# Patient Record
Sex: MALE | Race: WHITE | Employment: UNEMPLOYED | URBAN - METROPOLITAN AREA
[De-identification: names, ages, dates, MRNs, and addresses within clinical notes are randomized per-mention and may not be internally consistent; named-entity substitution may affect disease eponyms.]

---

## 2023-03-21 ENCOUNTER — HOSPITAL ENCOUNTER (INPATIENT)
Age: 23
LOS: 10 days | Discharge: HOME | DRG: 121 | End: 2023-03-31
Attending: EMERGENCY MEDICINE | Admitting: INTERNAL MEDICINE
Payer: MEDICAID

## 2023-03-21 ENCOUNTER — APPOINTMENT (OUTPATIENT)
Dept: CT IMAGING | Age: 23
DRG: 121 | End: 2023-03-21
Payer: MEDICAID

## 2023-03-21 DIAGNOSIS — K35.30 ACUTE APPENDICITIS WITH LOCALIZED PERITONITIS, WITHOUT PERFORATION, ABSCESS, OR GANGRENE: Primary | ICD-10-CM

## 2023-03-21 DIAGNOSIS — I31.39 PERICARDIAL EFFUSION: ICD-10-CM

## 2023-03-21 DIAGNOSIS — J90 PLEURAL EFFUSION: ICD-10-CM

## 2023-03-21 DIAGNOSIS — J90 LOCULATED PLEURAL EFFUSION: ICD-10-CM

## 2023-03-21 PROBLEM — K35.80 ACUTE APPENDICITIS: Status: ACTIVE | Noted: 2023-03-21

## 2023-03-21 PROCEDURE — 1200000000 HC SEMI PRIVATE

## 2023-03-21 PROCEDURE — 74177 CT ABD & PELVIS W/CONTRAST: CPT

## 2023-03-21 PROCEDURE — 99285 EMERGENCY DEPT VISIT HI MDM: CPT

## 2023-03-21 PROCEDURE — 6360000002 HC RX W HCPCS: Performed by: STUDENT IN AN ORGANIZED HEALTH CARE EDUCATION/TRAINING PROGRAM

## 2023-03-21 PROCEDURE — 6370000000 HC RX 637 (ALT 250 FOR IP): Performed by: STUDENT IN AN ORGANIZED HEALTH CARE EDUCATION/TRAINING PROGRAM

## 2023-03-21 PROCEDURE — 6360000004 HC RX CONTRAST MEDICATION: Performed by: RADIOLOGY

## 2023-03-21 PROCEDURE — 99222 1ST HOSP IP/OBS MODERATE 55: CPT | Performed by: SURGERY

## 2023-03-21 PROCEDURE — 2580000003 HC RX 258: Performed by: STUDENT IN AN ORGANIZED HEALTH CARE EDUCATION/TRAINING PROGRAM

## 2023-03-21 PROCEDURE — 2500000003 HC RX 250 WO HCPCS: Performed by: STUDENT IN AN ORGANIZED HEALTH CARE EDUCATION/TRAINING PROGRAM

## 2023-03-21 RX ORDER — SODIUM CHLORIDE 9 MG/ML
INJECTION, SOLUTION INTRAVENOUS CONTINUOUS
Status: DISCONTINUED | OUTPATIENT
Start: 2023-03-21 | End: 2023-03-23

## 2023-03-21 RX ORDER — SODIUM CHLORIDE 9 MG/ML
INJECTION, SOLUTION INTRAVENOUS PRN
Status: CANCELLED | OUTPATIENT
Start: 2023-03-21

## 2023-03-21 RX ORDER — SODIUM CHLORIDE 0.9 % (FLUSH) 0.9 %
10 SYRINGE (ML) INJECTION PRN
Status: DISCONTINUED | OUTPATIENT
Start: 2023-03-21 | End: 2023-03-31 | Stop reason: HOSPADM

## 2023-03-21 RX ORDER — LISDEXAMFETAMINE DIMESYLATE 60 MG/1
60 CAPSULE ORAL DAILY
COMMUNITY

## 2023-03-21 RX ORDER — SODIUM CHLORIDE 0.9 % (FLUSH) 0.9 %
10 SYRINGE (ML) INJECTION EVERY 12 HOURS SCHEDULED
Status: DISCONTINUED | OUTPATIENT
Start: 2023-03-21 | End: 2023-03-31 | Stop reason: HOSPADM

## 2023-03-21 RX ORDER — ONDANSETRON 2 MG/ML
4 INJECTION INTRAMUSCULAR; INTRAVENOUS
Status: CANCELLED | OUTPATIENT
Start: 2023-03-21 | End: 2023-03-22

## 2023-03-21 RX ORDER — ONDANSETRON 2 MG/ML
4 INJECTION INTRAMUSCULAR; INTRAVENOUS EVERY 6 HOURS PRN
Status: DISCONTINUED | OUTPATIENT
Start: 2023-03-21 | End: 2023-03-31 | Stop reason: HOSPADM

## 2023-03-21 RX ORDER — METRONIDAZOLE 500 MG/100ML
500 INJECTION, SOLUTION INTRAVENOUS EVERY 8 HOURS
Status: DISCONTINUED | OUTPATIENT
Start: 2023-03-21 | End: 2023-03-31 | Stop reason: HOSPADM

## 2023-03-21 RX ORDER — SODIUM CHLORIDE 0.9 % (FLUSH) 0.9 %
5-40 SYRINGE (ML) INJECTION PRN
Status: CANCELLED | OUTPATIENT
Start: 2023-03-21

## 2023-03-21 RX ORDER — SODIUM CHLORIDE 0.9 % (FLUSH) 0.9 %
5-40 SYRINGE (ML) INJECTION EVERY 12 HOURS SCHEDULED
Status: CANCELLED | OUTPATIENT
Start: 2023-03-21

## 2023-03-21 RX ORDER — SODIUM CHLORIDE 9 MG/ML
INJECTION, SOLUTION INTRAVENOUS PRN
Status: DISCONTINUED | OUTPATIENT
Start: 2023-03-21 | End: 2023-03-30 | Stop reason: SDUPTHER

## 2023-03-21 RX ORDER — POLYETHYLENE GLYCOL 3350 17 G/17G
17 POWDER, FOR SOLUTION ORAL DAILY
Status: DISCONTINUED | OUTPATIENT
Start: 2023-03-22 | End: 2023-03-23

## 2023-03-21 RX ORDER — ACETAMINOPHEN 325 MG/1
650 TABLET ORAL EVERY 4 HOURS PRN
Status: DISCONTINUED | OUTPATIENT
Start: 2023-03-21 | End: 2023-03-23

## 2023-03-21 RX ORDER — MEPERIDINE HYDROCHLORIDE 25 MG/ML
12.5 INJECTION INTRAMUSCULAR; INTRAVENOUS; SUBCUTANEOUS
Status: CANCELLED | OUTPATIENT
Start: 2023-03-21

## 2023-03-21 RX ADMIN — ACETAMINOPHEN 650 MG: 325 TABLET ORAL at 22:05

## 2023-03-21 RX ADMIN — IOPAMIDOL 75 ML: 755 INJECTION, SOLUTION INTRAVENOUS at 19:13

## 2023-03-21 RX ADMIN — HYDROMORPHONE HYDROCHLORIDE 0.5 MG: 1 INJECTION, SOLUTION INTRAMUSCULAR; INTRAVENOUS; SUBCUTANEOUS at 22:04

## 2023-03-21 RX ADMIN — HYDROMORPHONE HYDROCHLORIDE 0.25 MG: 1 INJECTION, SOLUTION INTRAMUSCULAR; INTRAVENOUS; SUBCUTANEOUS at 17:11

## 2023-03-21 RX ADMIN — CEFTRIAXONE SODIUM 2000 MG: 2 INJECTION, POWDER, FOR SOLUTION INTRAMUSCULAR; INTRAVENOUS at 16:59

## 2023-03-21 RX ADMIN — SODIUM CHLORIDE: 9 INJECTION, SOLUTION INTRAVENOUS at 16:57

## 2023-03-21 RX ADMIN — METRONIDAZOLE 500 MG: 500 INJECTION, SOLUTION INTRAVENOUS at 17:16

## 2023-03-21 ASSESSMENT — PAIN SCALES - GENERAL
PAINLEVEL_OUTOF10: 4
PAINLEVEL_OUTOF10: 5

## 2023-03-21 ASSESSMENT — PAIN DESCRIPTION - LOCATION: LOCATION: ABDOMEN

## 2023-03-21 NOTE — PROGRESS NOTES
Radiology Procedure Waiver   Name: Oseas Ceja  : 2000  MRN: [id number]    Date:  3/21/23    Time: 6:21 PM EDT    Benefits of immediately proceeding with Radiology exam(s) without pre-testing outweigh the risks or are not indicated as specified below and therefore the following is/are being waived:    [] Pregnancy test   [] Patients LMP on-time and regular.   [] Patient had Tubal Ligation or has other Contraception Device. [] Patient  is Menopausal or Premenarcheal.    [] Patient had Full or Partial Hysterectomy. [] Protocol for Iodine allergy    [] MRI Questionnaire     [x] BUN/Creatinine   [] Patient age w/no hx of renal dysfunction. [] Patient on Dialysis. [] Recent Normal Labs.   Electronically signed by Noemi Muniz DO on 3/21/23 at 6:21 PM EDT

## 2023-03-21 NOTE — H&P
GENERAL SURGERY  HISTORY AND PHYSICAL  3/21/2023    Chief Complaint   Patient presents with    Abdominal Pain     Transfer from [facility] for acute appendicitis and pulmonary edema concern for lymphoma       HPI  Sherine Snell III is a 21 y.o. male who presents for evaluation of right lower quadrant pain. Patient was a transfer from outside hospital.  Patient states that his pain started acutely at midnight 3/20 and has been constant and located in the right lower quadrant without radiation. Patient states at the time the pain was severe and accompanied with nausea and 1 episode of emesis. At outside facility patient was noted to have a leukocytosis of 16,000 as well as dilation of his appendix to 9 mm with fecaliths. Patient past medical history relatively unremarkable, only takes Vyvanse for ADHD. Patient denies any abdominal surgical history. Patient has never had an EGD or colonoscopy in the past.  Family history relatively unremarkable, states his aunt has breast cancer otherwise no family history. At outside hospital patient was given IV antibiotics and pain medications and was transferred here for surgical evaluation. No past medical history on file. No past surgical history on file. Prior to Admission medications    Not on File       Not on File    No family history on file.       Review of Systems: Review of Systems - History obtained from chart review and the patient  General ROS: negative for - chills or malaise  ENT ROS: negative for - sore throat  Allergy and Immunology ROS: negative for - hives  Hematological and Lymphatic ROS: negative for - fatigue or jaundice  Endocrine ROS: negative for - polydipsia/polyuria  Respiratory ROS: no cough, shortness of breath, or wheezing  Cardiovascular ROS: no chest pain or dyspnea on exertion  Gastrointestinal ROS: positive for - abdominal pain  negative for - heartburn, hematemesis, or melena  Genito-Urinary ROS: no dysuria, trouble voiding, or hematuria  Musculoskeletal ROS: negative for - muscular weakness        PHYSICAL EXAM:    Vitals:    03/21/23 1540   BP: (!) 107/59   Pulse: 75   Resp: 18   Temp: 98.7 °F (37.1 °C)   SpO2: 96%       GENERAL:  NAD. A&Ox3. HEAD:  Normocephalic, atraumatic. EYES:   No scleral icterus. PERRLA. LUNGS: Labored breathing on room air  CARDIOVASCULAR: RR, normotensive  ABDOMEN:  Soft, non-distended, mildly tender localized to the right lower quadrant. Positive for guarding in the right lower quadrant, no rigidity, rebound. ASSESSMENT/PLAN:  21 y.o. male with acute appendicitis    This procedure has been fully reviewed with the patient and written informed consent has been obtained. Plan will be  -Admit  -Modal pain control  -Rocephin and Flagyl  -N.p.o./IV fluids  -Laparoscopic appendectomy     discussed with Dr. Channing Navarrete.     Valdo Devlin DO  Surgery Resident PGY-1  3/21/2023  4:24 PM

## 2023-03-21 NOTE — ED NOTES
Report given to Skye Barahona RN on 5WE. Pt placed in transport at this time.       Heraclio Xiong RN  03/21/23 6753

## 2023-03-22 ENCOUNTER — APPOINTMENT (OUTPATIENT)
Dept: GENERAL RADIOLOGY | Age: 23
DRG: 121 | End: 2023-03-22
Payer: MEDICAID

## 2023-03-22 ENCOUNTER — APPOINTMENT (OUTPATIENT)
Dept: CT IMAGING | Age: 23
DRG: 121 | End: 2023-03-22
Payer: MEDICAID

## 2023-03-22 ENCOUNTER — APPOINTMENT (OUTPATIENT)
Dept: INTERVENTIONAL RADIOLOGY/VASCULAR | Age: 23
DRG: 121 | End: 2023-03-22
Payer: MEDICAID

## 2023-03-22 LAB
ANION GAP SERPL CALCULATED.3IONS-SCNC: 9 MMOL/L (ref 7–16)
BASOPHILS # BLD: 0.01 E9/L (ref 0–0.2)
BASOPHILS NFR BLD: 0.2 % (ref 0–2)
BUN SERPL-MCNC: 11 MG/DL (ref 6–20)
CALCIUM SERPL-MCNC: 9 MG/DL (ref 8.6–10.2)
CHLORIDE SERPL-SCNC: 104 MMOL/L (ref 98–107)
CO2 SERPL-SCNC: 24 MMOL/L (ref 22–29)
CREAT SERPL-MCNC: 0.8 MG/DL (ref 0.7–1.2)
EOSINOPHIL # BLD: 0.08 E9/L (ref 0.05–0.5)
EOSINOPHIL NFR BLD: 1.2 % (ref 0–6)
ERYTHROCYTE [DISTWIDTH] IN BLOOD BY AUTOMATED COUNT: 13.5 FL (ref 11.5–15)
GLUCOSE SERPL-MCNC: 78 MG/DL (ref 74–99)
HCT VFR BLD AUTO: 40.7 % (ref 37–54)
HGB BLD-MCNC: 13.2 G/DL (ref 12.5–16.5)
IMM GRANULOCYTES # BLD: 0.02 E9/L
IMM GRANULOCYTES NFR BLD: 0.3 % (ref 0–5)
INR BLD: 1.6
LYMPHOCYTES # BLD: 1 E9/L (ref 1.5–4)
LYMPHOCYTES NFR BLD: 15.4 % (ref 20–42)
MCH RBC QN AUTO: 28.9 PG (ref 26–35)
MCHC RBC AUTO-ENTMCNC: 32.4 % (ref 32–34.5)
MCV RBC AUTO: 89.3 FL (ref 80–99.9)
MONOCYTES # BLD: 0.54 E9/L (ref 0.1–0.95)
MONOCYTES NFR BLD: 8.3 % (ref 2–12)
NEUTROPHILS # BLD: 4.86 E9/L (ref 1.8–7.3)
NEUTS SEG NFR BLD: 74.6 % (ref 43–80)
PLATELET # BLD AUTO: 230 E9/L (ref 130–450)
PMV BLD AUTO: 9.5 FL (ref 7–12)
POTASSIUM SERPL-SCNC: 4.8 MMOL/L (ref 3.5–5)
PROTHROMBIN TIME: 17.6 SEC (ref 9.3–12.4)
RBC # BLD AUTO: 4.56 E12/L (ref 3.8–5.8)
SODIUM SERPL-SCNC: 137 MMOL/L (ref 132–146)
WBC # BLD: 6.5 E9/L (ref 4.5–11.5)

## 2023-03-22 PROCEDURE — 85610 PROTHROMBIN TIME: CPT

## 2023-03-22 PROCEDURE — 76604 US EXAM CHEST: CPT

## 2023-03-22 PROCEDURE — 71045 X-RAY EXAM CHEST 1 VIEW: CPT

## 2023-03-22 PROCEDURE — 6360000002 HC RX W HCPCS: Performed by: STUDENT IN AN ORGANIZED HEALTH CARE EDUCATION/TRAINING PROGRAM

## 2023-03-22 PROCEDURE — 36415 COLL VENOUS BLD VENIPUNCTURE: CPT

## 2023-03-22 PROCEDURE — 2580000003 HC RX 258: Performed by: STUDENT IN AN ORGANIZED HEALTH CARE EDUCATION/TRAINING PROGRAM

## 2023-03-22 PROCEDURE — 6370000000 HC RX 637 (ALT 250 FOR IP)

## 2023-03-22 PROCEDURE — 1200000000 HC SEMI PRIVATE

## 2023-03-22 PROCEDURE — 6360000004 HC RX CONTRAST MEDICATION: Performed by: RADIOLOGY

## 2023-03-22 PROCEDURE — 2580000003 HC RX 258: Performed by: RADIOLOGY

## 2023-03-22 PROCEDURE — 71260 CT THORAX DX C+: CPT

## 2023-03-22 PROCEDURE — 85025 COMPLETE CBC W/AUTO DIFF WBC: CPT

## 2023-03-22 PROCEDURE — 99232 SBSQ HOSP IP/OBS MODERATE 35: CPT | Performed by: SURGERY

## 2023-03-22 PROCEDURE — 2500000003 HC RX 250 WO HCPCS: Performed by: STUDENT IN AN ORGANIZED HEALTH CARE EDUCATION/TRAINING PROGRAM

## 2023-03-22 PROCEDURE — 80048 BASIC METABOLIC PNL TOTAL CA: CPT

## 2023-03-22 PROCEDURE — 0W9B3ZZ DRAINAGE OF LEFT PLEURAL CAVITY, PERCUTANEOUS APPROACH: ICD-10-PCS | Performed by: STUDENT IN AN ORGANIZED HEALTH CARE EDUCATION/TRAINING PROGRAM

## 2023-03-22 RX ORDER — SODIUM CHLORIDE 0.9 % (FLUSH) 0.9 %
10 SYRINGE (ML) INJECTION
Status: COMPLETED | OUTPATIENT
Start: 2023-03-22 | End: 2023-03-22

## 2023-03-22 RX ORDER — OXYCODONE HYDROCHLORIDE 5 MG/1
5 TABLET ORAL EVERY 4 HOURS PRN
Status: DISCONTINUED | OUTPATIENT
Start: 2023-03-22 | End: 2023-03-26

## 2023-03-22 RX ORDER — HYDROCODONE BITARTRATE AND ACETAMINOPHEN 5; 325 MG/1; MG/1
1 TABLET ORAL EVERY 6 HOURS PRN
Status: DISCONTINUED | OUTPATIENT
Start: 2023-03-22 | End: 2023-03-22 | Stop reason: ALTCHOICE

## 2023-03-22 RX ORDER — OXYCODONE HYDROCHLORIDE 10 MG/1
10 TABLET ORAL EVERY 4 HOURS PRN
Status: DISCONTINUED | OUTPATIENT
Start: 2023-03-22 | End: 2023-03-24

## 2023-03-22 RX ADMIN — CEFTRIAXONE SODIUM 2000 MG: 2 INJECTION, POWDER, FOR SOLUTION INTRAMUSCULAR; INTRAVENOUS at 16:32

## 2023-03-22 RX ADMIN — METRONIDAZOLE 500 MG: 500 INJECTION, SOLUTION INTRAVENOUS at 16:37

## 2023-03-22 RX ADMIN — HYDROMORPHONE HYDROCHLORIDE 0.5 MG: 1 INJECTION, SOLUTION INTRAMUSCULAR; INTRAVENOUS; SUBCUTANEOUS at 05:54

## 2023-03-22 RX ADMIN — Medication 10 ML: at 14:38

## 2023-03-22 RX ADMIN — SODIUM CHLORIDE, PRESERVATIVE FREE 10 ML: 5 INJECTION INTRAVENOUS at 05:54

## 2023-03-22 RX ADMIN — OXYCODONE HYDROCHLORIDE 10 MG: 10 TABLET ORAL at 22:06

## 2023-03-22 RX ADMIN — METRONIDAZOLE 500 MG: 500 INJECTION, SOLUTION INTRAVENOUS at 08:02

## 2023-03-22 RX ADMIN — OXYCODONE HYDROCHLORIDE 5 MG: 5 TABLET ORAL at 16:31

## 2023-03-22 RX ADMIN — IOPAMIDOL 75 ML: 755 INJECTION, SOLUTION INTRAVENOUS at 14:38

## 2023-03-22 RX ADMIN — METRONIDAZOLE 500 MG: 500 INJECTION, SOLUTION INTRAVENOUS at 00:41

## 2023-03-22 ASSESSMENT — PAIN DESCRIPTION - LOCATION
LOCATION: ABDOMEN

## 2023-03-22 ASSESSMENT — PAIN DESCRIPTION - DESCRIPTORS
DESCRIPTORS: ACHING;DISCOMFORT;NAGGING
DESCRIPTORS: ACHING;STABBING;DISCOMFORT
DESCRIPTORS: ACHING;DISCOMFORT;SORE

## 2023-03-22 ASSESSMENT — PAIN SCALES - GENERAL
PAINLEVEL_OUTOF10: 7
PAINLEVEL_OUTOF10: 7
PAINLEVEL_OUTOF10: 4
PAINLEVEL_OUTOF10: 0
PAINLEVEL_OUTOF10: 4

## 2023-03-22 ASSESSMENT — PAIN DESCRIPTION - PAIN TYPE
TYPE: ACUTE PAIN
TYPE: ACUTE PAIN

## 2023-03-22 ASSESSMENT — PAIN - FUNCTIONAL ASSESSMENT: PAIN_FUNCTIONAL_ASSESSMENT: ACTIVITIES ARE NOT PREVENTED

## 2023-03-22 ASSESSMENT — PAIN DESCRIPTION - ORIENTATION
ORIENTATION: RIGHT;LEFT
ORIENTATION: RIGHT
ORIENTATION: RIGHT

## 2023-03-22 ASSESSMENT — PAIN DESCRIPTION - ONSET: ONSET: ON-GOING

## 2023-03-22 ASSESSMENT — PAIN DESCRIPTION - FREQUENCY: FREQUENCY: CONTINUOUS

## 2023-03-22 NOTE — BRIEF OP NOTE
Brief Postoperative Note      Patient: Patel Laguna III  YOB: 2000  MRN: [id number]    Date of Procedure: 3/22/2023    Pre-Op Diagnosis: * Left consolidative lung process  Post-Op Diagnosis: Same       Ultrasound evaluation. Tiny volume of loculated fluid only. Patient had a vagal episode and procedure was stopped    Marcello Ferrer    Assistant:  * No surgical staff found *    Anesthesia: * No anesthesia type entered *    Estimated Blood Loss (mL): Minimal    Complications: None    Specimens:   * No specimens in log *    Implants:  * No implants in log *      Drains: * No LDAs found *    Findings:Tiny volume of fluid only on US. Very loculated. No procedure performed.     Electronically signed by Marcello Ferrer MD on 3/22/2023 at 4:26 PM

## 2023-03-22 NOTE — PROCEDURES
Dr Sanket Guidry attempted to place thoracentesis catheter but was unable to draw any fluid due to location of adequate fluid for drainage and anatomy. Patient became light headed and stated he felt like he was going to pass out. BP dropped to 80/52 with HR of 46. Procedure stopped at this time and will be rescheduled per Dr Sanket Guidry. 500mL 0.9% NSS bolus given at that time. Patient states he feels better and no longer feels lightheaded or dizzy. BP now 100/60 with HR of 60. Patient to CT for scan at this time, report called.

## 2023-03-22 NOTE — PROCEDURES
Patient arrived via stretcher to Radiology department for a left thoracentesis with Dr. Reny García. Patient is alert and oriented, denies shortness of breath or discomfort. Allergies, medications, H&P and fasting instructions reviewed with patient. Patient placed on continuous heart monitor, vital signs taken. Procedural instructions given, questions answered, understanding expressed. Awaiting for Dr. Reny García to talk to patient and have procedural consent signed.

## 2023-03-22 NOTE — PROGRESS NOTES
GENERAL SURGERY  DAILY PROGRESS NOTE  3/22/2023  Chief Complaint   Patient presents with    Abdominal Pain     Transfer from [facility] for acute appendicitis and pulmonary edema concern for lymphoma       Subjective:  Pain  improved this morning. No nausea no vomiting. Objective:  /69   Pulse 64   Temp 97.4 °F (36.3 °C) (Temporal)   Resp 16   Ht 5' 11\" (1.803 m)   Wt 165 lb (74.8 kg)   SpO2 97%   BMI 23.01 kg/m²     GENERAL:  Laying in bed, awake, alert, cooperative, no apparent distress  HEAD: Normocephalic, atraumatic  EYES: No sclera icterus, pupils equal  LUNGS:  No increased work of breathing  CARDIOVASCULAR:  regular rate  ABDOMEN:  Soft,  mild TTP right lower quadrant, no peritoneal signs, no distention  EXTREMITIES: No edema or swelling  SKIN: Warm and dry, no rashes or lesions    Assessment/Plan:  21 y.o. male with signs and symptoms consistent with acute appendicitis. Patient noted to have a very large left-sided pleural effusion with pericardial effusion. We will continue to treat acute appendicitis nonoperatively with IV antibiotics  I will ask interventional radiology team to perform a thoracentesis on his left chest and send for thoracentesis studies. Patient noted to have weight loss, night sweats, and generalized malaise over the past months to year    Electronically signed by Sabi Kelly MD on 3/22/2023 at 7:27 AM      Attending Attestation   I saw and examined the patient, I agree with resident note      Hx taken from patient and chart    I personally reviewed the imaging, pleural effusion and pericardial effusion,      CT imaging chest ordered,  IR drainage from pleural fluid ordered     Discussed uncertainty of dx with pt and girlfriend at bedside. He has likely simple acute appendicitis which at this point we will treat with Abx rather than surgery in lieu of work up for pericardial and pleural fluid collections.      I am managing his pain with PRN IV narcotics.       Goran Shetty MD FACS

## 2023-03-22 NOTE — CARE COORDINATION
3/22/2023social work transition of care planning  Pt is from home with family and had been independent. Pt sees a pcp at a clinic in [id number] [address]) and pharmacy is [facility] in [place]. Explained sw role in transition of care planning. Pt plan is home, pt will have a ride at discharge.   Electronically signed by LIDIA Anderson on 3/22/2023 at 10:20 AM

## 2023-03-22 NOTE — DISCHARGE SUMMARY
Physician Discharge Summary     Patient ID:  Melo Boyd  [id number]  60 y.o.  2000    Admit date: 3/21/2023    Discharge date and time: No discharge date for patient encounter. Admitting Physician: Ofelia Monzon MD     Admission Diagnoses: Pleural effusion [J90]  Acute appendicitis [K35.80]  Acute appendicitis with localized peritonitis, without perforation, abscess, or gangrene [K35.30]    Discharge Diagnoses: Principal Problem:    Acute appendicitis  Resolved Problems:    * No resolved hospital problems. *      Admission Condition: good    Discharged Condition: stable    Indication for Admission: Acute appendicitis, large left-sided pleural effusion with pericardial effusion    Hospital Course/Procedures/Operation/treatments:   3/21-22: Patient admitted with signs and symptoms consistent with acute appendicitis. He was found to have a large left-sided pleural effusion and pericardial effusion. IR will attempt thoracentesis today  3/23: IR unable to drain the left pleural effusion because of that the fluid was too thick. CT surgery consulted          Consults:   IP CONSULT TO GENERAL SURGERY    Significant Diagnostic Studies:   CT ABDOMEN PELVIS W IV CONTRAST Additional Contrast? None    Result Date: 3/21/2023  EXAMINATION: CT OF THE ABDOMEN AND PELVIS WITH CONTRAST 3/21/2023 7:13 pm TECHNIQUE: CT of the abdomen and pelvis was performed with the administration of intravenous contrast. Multiplanar reformatted images are provided for review. Automated exposure control, iterative reconstruction, and/or weight based adjustment of the mA/kV was utilized to reduce the radiation dose to as low as reasonably achievable. COMPARISON: None.  HISTORY: ORDERING SYSTEM PROVIDED HISTORY: rlq pain TECHNOLOGIST PROVIDED HISTORY: Reason for exam:->rlq pain Additional Contrast?->None What reading provider will be dictating this exam?->CRC FINDINGS: Enlarged diameter of the appendix measuring up to 9 mm with hyperemic mucosa and subtle adjacent fat stranding. Small free fluid located in the pelvis. No free air. No bowel obstruction. No intrahepatic bile duct dilatation. Mild distension of the gallbladder with small pericholecystic fluid. Spleen is unremarkable. Numerous cysts or hemangiomas are associated with the spleen. Normal attenuation to both kidneys. No hydronephrosis or perinephric edema. No retroperitoneal lymphadenopathy. Urinary bladder is normal in contour. There is atelectasis seen in the left lung base with volume loss. There is a moderate left sub pleural effusion of increased attenuation. Moderate pericardial effusion. Severe central canal stenosis seen at L4/L5 related to disc herniation. 1. Findings above suggest acute appendicitis. 2. Small free fluid in the pelvis. 3. Mild distension of the gallbladder with suspected small pericholecystic fluid versus gallbladder wall thickening. Ultrasound gallbladder could be helpful for further evaluation. 4. Volume loss associated with visualized left lower lung with subpleural effusion of increased attenuation. Clinical correlation recommended. 5. Atelectatic changes are also present in visualized left lung base. 6. Moderate pericardial effusion. Discharge Exam:  *** Copy and paste last PN exam ***     Disposition: {disposition:22280}    In process/preliminary results:  Outstanding Order Results       No orders found for last 30 day(s). Patient Instructions:   Current Discharge Medication List             Details   Lisdexamfetamine Dimesylate (VYVANSE) 60 MG CAPS Take 60 mg by mouth daily. ***Copy and Paste Discharge Instructions***     Follow up:   No follow-up provider specified.      Signed:  Vianney Mccarty MD  3/22/2023  7:33 AM

## 2023-03-22 NOTE — PROGRESS NOTES
[facility]   ATTENDING PHYSICIAN PROGRESS NOTE     I have examined the patient, reviewed the record, and discussed the case with the APN/ Resident. I have reviewed all relevant labs and imaging data. Please refer to the APN/ resident's note. I agree with the assessment and plan with the following corrections/ additions. The following summarizes my clinical findings and independent assessment. CC: RLQ pain    See resident note for full details. Pt reports acute onset of pain in RLQ. Presented to OSH and CT concerning for enlarged fluid filled appendix. Pt transferred here for definitive care. States pain has been present since onset but does not radiate anywhere. Denies nausea or vomiting. Reports he is hungry now. States he has intermittent night sweats; reports generalized malaise; states he had 20 pound unintentional weight loss over past year. Notes from OSH reviewed  Labs from OSH reviewed--WBCs 13.6  Film reports from OSH reviewed--fluid filled dilated appendix; pericardial effusion/pleural effusion    Upon exam, slight tenderness in RLQ but no rebound; no guarding    Given CT findings but no images sent--will repeat CT abd/pelvis. Repeat imaging reviewed--fluid filled dilated appendix; significant pleural/pericardial effusions; ? Multiple small cysts in spleen; distended GB    RLQ pain--could be related to early appendicitis--but, given CT findings, pt needs pleural fluid evaluated and pericardial fluid cause evaluated.     Will cont Abx and monitor abd exam; recommend IR thoracentesis; may need pericardial window also    Myles Tatum MD, [facility]  3/21/2023  9:37 PM

## 2023-03-22 NOTE — PROGRESS NOTES
Spoke with RN regarding patient's ordered left thoracentesis. Ordering provider will need to order new INR, keep patient NPO, and hold all thinners for procedure. Patient is able to sign consent.

## 2023-03-22 NOTE — ED NOTES
Per provider unable to send pt to inpatient room at this time. Waiting for Dr. Cassie Blevins to give permission. Surgeon needs to review CT at this time.       Jeff Angel RN  03/21/23 2028

## 2023-03-22 NOTE — PROGRESS NOTES
General Surgery Progress Note  3/22/2023  Medical Student Note  Admission Date: 3/21/23  Hospital Day: 1    Subjective  Mr. Arvind Pichardo is a 20 y/o male that was transferred from [facility] for acute appendicitis. Patient notes that his pain started on 3/20/23 at midnight with cramping and a burning sensation in his RLQ. He notes that the pain was associated with nausea and vomiting. At [facility] [place] [id number] and CT abdomen and pelvis showed dilation of appendix. Patient also notes he has lost about 20 lbs in the past year, having symptoms of night sweats, wheezing and general malaise. On today's examination patient notes his pain has been controlled. He denies fevers, chills, chest pain, SOB, or any other symptoms. Objective  Vitals:   Temp: 97 RR: 17 HR: 57 BP: 87/65 SpO2: 97    Physical Exam:   General: No acute distress, Laying on left lateral recumbent  Head: Atraumatic, no lacerations  Neck: Trachea midline  Eyes: PERRLA  Heart: RRR  Lungs: Patient breathing on room air, no usage of accessory muscles, no acute respiratory distress  Abdomen: Soft, non-distended, tenderness to palpation of RLQ, no tenderness to RUQ, LLQ. LUQ or epigastric. Extremities: Moves all extremities, sensation intact, normal capillary refill  Mentation: Aox4    Pertinent labs  CT Abdomen Pelvis  Narrative   EXAMINATION:   CT OF THE ABDOMEN AND PELVIS WITH CONTRAST 3/21/2023 7:13 pm       TECHNIQUE:   CT of the abdomen and pelvis was performed with the administration of   intravenous contrast. Multiplanar reformatted images are provided for review. Automated exposure control, iterative reconstruction, and/or weight based   adjustment of the mA/kV was utilized to reduce the radiation dose to as low   as reasonably achievable. COMPARISON:   None.        HISTORY:   ORDERING SYSTEM PROVIDED HISTORY: rlq pain   TECHNOLOGIST PROVIDED HISTORY:   Reason for exam:->rlq pain   Additional Contrast?->None

## 2023-03-23 ENCOUNTER — APPOINTMENT (OUTPATIENT)
Dept: GENERAL RADIOLOGY | Age: 23
DRG: 121 | End: 2023-03-23
Payer: MEDICAID

## 2023-03-23 ENCOUNTER — ANESTHESIA EVENT (OUTPATIENT)
Dept: OPERATING ROOM | Age: 23
End: 2023-03-23
Payer: MEDICAID

## 2023-03-23 PROBLEM — J90 LOCULATED PLEURAL EFFUSION: Status: ACTIVE | Noted: 2023-03-21

## 2023-03-23 LAB
ALBUMIN SERPL-MCNC: 3.7 G/DL (ref 3.5–5.2)
ALP SERPL-CCNC: 121 U/L (ref 40–129)
ALT SERPL-CCNC: 8 U/L (ref 0–40)
ANION GAP SERPL CALCULATED.3IONS-SCNC: 10 MMOL/L (ref 7–16)
AST SERPL-CCNC: 12 U/L (ref 0–39)
B PARAP IS1001 DNA NPH QL NAA+NON-PROBE: NOT DETECTED
B PERT.PT PRMT NPH QL NAA+NON-PROBE: NOT DETECTED
BASOPHILS # BLD: 0.02 E9/L (ref 0–0.2)
BASOPHILS NFR BLD: 0.3 % (ref 0–2)
BILIRUB SERPL-MCNC: 0.5 MG/DL (ref 0–1.2)
BUN SERPL-MCNC: 10 MG/DL (ref 6–20)
C PNEUM DNA NPH QL NAA+NON-PROBE: NOT DETECTED
CALCIUM SERPL-MCNC: 8.8 MG/DL (ref 8.6–10.2)
CHLORIDE SERPL-SCNC: 105 MMOL/L (ref 98–107)
CK SERPL-CCNC: 160 U/L (ref 20–200)
CO2 SERPL-SCNC: 22 MMOL/L (ref 22–29)
CREAT SERPL-MCNC: 0.8 MG/DL (ref 0.7–1.2)
CRP SERPL HS-MCNC: 4.8 MG/DL (ref 0–0.4)
EOSINOPHIL # BLD: 0.09 E9/L (ref 0.05–0.5)
EOSINOPHIL NFR BLD: 1.3 % (ref 0–6)
ERYTHROCYTE [DISTWIDTH] IN BLOOD BY AUTOMATED COUNT: 13.6 FL (ref 11.5–15)
ERYTHROCYTE [SEDIMENTATION RATE] IN BLOOD BY WESTERGREN METHOD: 20 MM/HR (ref 0–15)
FLUAV RNA NPH QL NAA+NON-PROBE: NOT DETECTED
FLUBV RNA NPH QL NAA+NON-PROBE: NOT DETECTED
GLUCOSE SERPL-MCNC: 83 MG/DL (ref 74–99)
HADV DNA NPH QL NAA+NON-PROBE: NOT DETECTED
HCOV 229E RNA NPH QL NAA+NON-PROBE: NOT DETECTED
HCOV HKU1 RNA NPH QL NAA+NON-PROBE: NOT DETECTED
HCOV NL63 RNA NPH QL NAA+NON-PROBE: NOT DETECTED
HCOV OC43 RNA NPH QL NAA+NON-PROBE: NOT DETECTED
HCT VFR BLD AUTO: 40.3 % (ref 37–54)
HGB BLD-MCNC: 13.3 G/DL (ref 12.5–16.5)
HMPV RNA NPH QL NAA+NON-PROBE: NOT DETECTED
HPIV1 RNA NPH QL NAA+NON-PROBE: NOT DETECTED
HPIV2 RNA NPH QL NAA+NON-PROBE: NOT DETECTED
HPIV3 RNA NPH QL NAA+NON-PROBE: NOT DETECTED
HPIV4 RNA NPH QL NAA+NON-PROBE: NOT DETECTED
IMM GRANULOCYTES # BLD: 0.05 E9/L
IMM GRANULOCYTES NFR BLD: 0.7 % (ref 0–5)
LV EF: 63 %
LVEF MODALITY: NORMAL
LYMPHOCYTES # BLD: 0.69 E9/L (ref 1.5–4)
LYMPHOCYTES NFR BLD: 9.9 % (ref 20–42)
M PNEUMO DNA NPH QL NAA+NON-PROBE: NOT DETECTED
MAGNESIUM SERPL-MCNC: 2 MG/DL (ref 1.6–2.6)
MCH RBC QN AUTO: 29 PG (ref 26–35)
MCHC RBC AUTO-ENTMCNC: 33 % (ref 32–34.5)
MCV RBC AUTO: 87.8 FL (ref 80–99.9)
MONOCYTES # BLD: 0.53 E9/L (ref 0.1–0.95)
MONOCYTES NFR BLD: 7.6 % (ref 2–12)
NEUTROPHILS # BLD: 5.58 E9/L (ref 1.8–7.3)
NEUTS SEG NFR BLD: 80.2 % (ref 43–80)
PLATELET # BLD AUTO: 241 E9/L (ref 130–450)
PMV BLD AUTO: 9.2 FL (ref 7–12)
POTASSIUM SERPL-SCNC: 4 MMOL/L (ref 3.5–5)
PROT SERPL-MCNC: 7 G/DL (ref 6.4–8.3)
RBC # BLD AUTO: 4.59 E12/L (ref 3.8–5.8)
RSV RNA NPH QL NAA+NON-PROBE: NOT DETECTED
RV+EV RNA NPH QL NAA+NON-PROBE: NOT DETECTED
SARS-COV-2 RNA NPH QL NAA+NON-PROBE: NOT DETECTED
SODIUM SERPL-SCNC: 137 MMOL/L (ref 132–146)
WBC # BLD: 7 E9/L (ref 4.5–11.5)

## 2023-03-23 PROCEDURE — 0202U NFCT DS 22 TRGT SARS-COV-2: CPT

## 2023-03-23 PROCEDURE — 6360000002 HC RX W HCPCS: Performed by: INTERNAL MEDICINE

## 2023-03-23 PROCEDURE — 86780 TREPONEMA PALLIDUM: CPT

## 2023-03-23 PROCEDURE — 85025 COMPLETE CBC W/AUTO DIFF WBC: CPT

## 2023-03-23 PROCEDURE — 82550 ASSAY OF CK (CPK): CPT

## 2023-03-23 PROCEDURE — 2060000000 HC ICU INTERMEDIATE R&B

## 2023-03-23 PROCEDURE — 2500000003 HC RX 250 WO HCPCS: Performed by: INTERNAL MEDICINE

## 2023-03-23 PROCEDURE — 6360000002 HC RX W HCPCS: Performed by: STUDENT IN AN ORGANIZED HEALTH CARE EDUCATION/TRAINING PROGRAM

## 2023-03-23 PROCEDURE — 2580000003 HC RX 258: Performed by: INTERNAL MEDICINE

## 2023-03-23 PROCEDURE — 71045 X-RAY EXAM CHEST 1 VIEW: CPT

## 2023-03-23 PROCEDURE — 2580000003 HC RX 258: Performed by: STUDENT IN AN ORGANIZED HEALTH CARE EDUCATION/TRAINING PROGRAM

## 2023-03-23 PROCEDURE — 2500000003 HC RX 250 WO HCPCS: Performed by: STUDENT IN AN ORGANIZED HEALTH CARE EDUCATION/TRAINING PROGRAM

## 2023-03-23 PROCEDURE — 86038 ANTINUCLEAR ANTIBODIES: CPT

## 2023-03-23 PROCEDURE — 83735 ASSAY OF MAGNESIUM: CPT

## 2023-03-23 PROCEDURE — 6370000000 HC RX 637 (ALT 250 FOR IP): Performed by: INTERNAL MEDICINE

## 2023-03-23 PROCEDURE — 99254 IP/OBS CNSLTJ NEW/EST MOD 60: CPT | Performed by: STUDENT IN AN ORGANIZED HEALTH CARE EDUCATION/TRAINING PROGRAM

## 2023-03-23 PROCEDURE — 85651 RBC SED RATE NONAUTOMATED: CPT

## 2023-03-23 PROCEDURE — A4216 STERILE WATER/SALINE, 10 ML: HCPCS | Performed by: INTERNAL MEDICINE

## 2023-03-23 PROCEDURE — 80053 COMPREHEN METABOLIC PANEL: CPT

## 2023-03-23 PROCEDURE — 86431 RHEUMATOID FACTOR QUANT: CPT

## 2023-03-23 PROCEDURE — 86140 C-REACTIVE PROTEIN: CPT

## 2023-03-23 PROCEDURE — 86703 HIV-1/HIV-2 1 RESULT ANTBDY: CPT

## 2023-03-23 PROCEDURE — 93308 TTE F-UP OR LMTD: CPT

## 2023-03-23 PROCEDURE — 99255 IP/OBS CONSLTJ NEW/EST HI 80: CPT | Performed by: INTERNAL MEDICINE

## 2023-03-23 PROCEDURE — 80074 ACUTE HEPATITIS PANEL: CPT

## 2023-03-23 PROCEDURE — 86592 SYPHILIS TEST NON-TREP QUAL: CPT

## 2023-03-23 PROCEDURE — 86665 EPSTEIN-BARR CAPSID VCA: CPT

## 2023-03-23 PROCEDURE — 36415 COLL VENOUS BLD VENIPUNCTURE: CPT

## 2023-03-23 RX ORDER — MECOBALAMIN 5000 MCG
5 TABLET,DISINTEGRATING ORAL NIGHTLY PRN
Status: DISCONTINUED | OUTPATIENT
Start: 2023-03-23 | End: 2023-03-24

## 2023-03-23 RX ORDER — SENNA AND DOCUSATE SODIUM 50; 8.6 MG/1; MG/1
2 TABLET, FILM COATED ORAL 2 TIMES DAILY PRN
Status: DISCONTINUED | OUTPATIENT
Start: 2023-03-23 | End: 2023-03-24

## 2023-03-23 RX ORDER — SENNA AND DOCUSATE SODIUM 50; 8.6 MG/1; MG/1
2 TABLET, FILM COATED ORAL EVERY EVENING
Status: DISCONTINUED | OUTPATIENT
Start: 2023-03-23 | End: 2023-03-31 | Stop reason: HOSPADM

## 2023-03-23 RX ORDER — SODIUM CHLORIDE 9 MG/ML
INJECTION, SOLUTION INTRAVENOUS PRN
Status: DISCONTINUED | OUTPATIENT
Start: 2023-03-23 | End: 2023-03-31 | Stop reason: HOSPADM

## 2023-03-23 RX ORDER — SODIUM CHLORIDE 9 MG/ML
INJECTION, SOLUTION INTRAVENOUS CONTINUOUS
Status: DISCONTINUED | OUTPATIENT
Start: 2023-03-23 | End: 2023-03-24

## 2023-03-23 RX ORDER — POLYETHYLENE GLYCOL 3350 17 G/17G
17 POWDER, FOR SOLUTION ORAL 2 TIMES DAILY
Status: DISCONTINUED | OUTPATIENT
Start: 2023-03-23 | End: 2023-03-31 | Stop reason: HOSPADM

## 2023-03-23 RX ORDER — MECOBALAMIN 5000 MCG
5 TABLET,DISINTEGRATING ORAL NIGHTLY
Status: DISCONTINUED | OUTPATIENT
Start: 2023-03-23 | End: 2023-03-24

## 2023-03-23 RX ORDER — COLCHICINE 0.6 MG/1
0.6 TABLET ORAL DAILY
Status: DISCONTINUED | OUTPATIENT
Start: 2023-03-23 | End: 2023-03-31 | Stop reason: HOSPADM

## 2023-03-23 RX ORDER — ACETAMINOPHEN 500 MG
1000 TABLET ORAL 4 TIMES DAILY
Status: DISCONTINUED | OUTPATIENT
Start: 2023-03-23 | End: 2023-03-24

## 2023-03-23 RX ORDER — DIPHENHYDRAMINE HYDROCHLORIDE 50 MG/ML
50 INJECTION INTRAMUSCULAR; INTRAVENOUS ONCE
Status: COMPLETED | OUTPATIENT
Start: 2023-03-23 | End: 2023-03-23

## 2023-03-23 RX ORDER — LACTOBACILLUS RHAMNOSUS GG 10B CELL
1 CAPSULE ORAL 3 TIMES DAILY
Status: DISCONTINUED | OUTPATIENT
Start: 2023-03-23 | End: 2023-03-31 | Stop reason: HOSPADM

## 2023-03-23 RX ADMIN — VANCOMYCIN HYDROCHLORIDE 1750 MG: 10 INJECTION, POWDER, LYOPHILIZED, FOR SOLUTION INTRAVENOUS at 18:50

## 2023-03-23 RX ADMIN — DIPHENHYDRAMINE HYDROCHLORIDE 50 MG: 50 INJECTION, SOLUTION INTRAMUSCULAR; INTRAVENOUS at 21:01

## 2023-03-23 RX ADMIN — Medication 1 CAPSULE: at 11:43

## 2023-03-23 RX ADMIN — Medication 1 CAPSULE: at 17:18

## 2023-03-23 RX ADMIN — FAMOTIDINE 20 MG: 10 INJECTION, SOLUTION INTRAVENOUS at 21:01

## 2023-03-23 RX ADMIN — SENNOSIDES AND DOCUSATE SODIUM 2 TABLET: 8.6; 5 TABLET ORAL at 17:18

## 2023-03-23 RX ADMIN — CEFTRIAXONE SODIUM 2000 MG: 2 INJECTION, POWDER, FOR SOLUTION INTRAMUSCULAR; INTRAVENOUS at 17:19

## 2023-03-23 RX ADMIN — METRONIDAZOLE 500 MG: 500 INJECTION, SOLUTION INTRAVENOUS at 17:32

## 2023-03-23 RX ADMIN — SODIUM CHLORIDE: 9 INJECTION, SOLUTION INTRAVENOUS at 11:39

## 2023-03-23 RX ADMIN — METRONIDAZOLE 500 MG: 500 INJECTION, SOLUTION INTRAVENOUS at 09:04

## 2023-03-23 RX ADMIN — SODIUM CHLORIDE: 9 INJECTION, SOLUTION INTRAVENOUS at 16:47

## 2023-03-23 RX ADMIN — ACETAMINOPHEN 1000 MG: 500 TABLET ORAL at 17:19

## 2023-03-23 RX ADMIN — METRONIDAZOLE 500 MG: 500 INJECTION, SOLUTION INTRAVENOUS at 00:51

## 2023-03-23 RX ADMIN — Medication 1 CAPSULE: at 21:03

## 2023-03-23 RX ADMIN — SODIUM CHLORIDE: 9 INJECTION, SOLUTION INTRAVENOUS at 02:23

## 2023-03-23 RX ADMIN — COLCHICINE 0.6 MG: 0.6 TABLET ORAL at 17:59

## 2023-03-23 RX ADMIN — ONDANSETRON 4 MG: 2 INJECTION INTRAMUSCULAR; INTRAVENOUS at 21:10

## 2023-03-23 RX ADMIN — ACETAMINOPHEN 1000 MG: 500 TABLET ORAL at 21:01

## 2023-03-23 ASSESSMENT — PAIN SCALES - GENERAL: PAINLEVEL_OUTOF10: 2

## 2023-03-23 ASSESSMENT — ENCOUNTER SYMPTOMS
SHORTNESS OF BREATH: 0
NAUSEA: 0
ABDOMINAL PAIN: 1
EYE REDNESS: 0
VOMITING: 0

## 2023-03-23 NOTE — PROGRESS NOTES
Hospitalist Progress Note            Patient: Carl Winters Age: 21 y.o.   DOA: 3/21/2023 Admit Dx / CC: Pleural effusion [J90]  Acute appendicitis [K35.80]  Acute appendicitis with localized peritonitis, without perforation, abscess, or gangrene [K35.30]   LOS:  LOS: 2 days      Assessment/ Plan:     3/21-22: Patient admitted to surgery service from OSH where he presented with signs and symptoms consistent with acute appendicitis. CT concerning for enlarged fluid filled appendix. He was found to have a large left-sided pleural effusion and pericardial effusion. On 3/22: IR attempted thoracentesis but unable to drain the left pleural effusion because of that the fluid was too thick. CT surgery consulted. Surgery asked to transfer to medicine service as no surgical plans and pt was accepted to hospitalist service. CTS plan L VATS 3/24. Acute appendicitis  continue to treat acute appendicitis nonoperatively with IV antibiotics per GS  D/w Dr Loco Alonzo and recommends 10 days total of abx for acute appendicitis from his POV and can switch to Augmentin on discharge  Add probiotic    Large L pleural effusion & Moderate pericardial effusion  No hx/o IVDU per pt  Failed IR attempt for thoracentesis on 3/23 as fluid was too thick  Check ESR/CRP  Check RICK  Check CPK  HIV scrn  Hepatitis scrn  Check RPR, EBV  CTS consulted and plan VATS tomorrow  Will need fluid analysis from surgery  ID consult  MIKEY: Normal left ventricle size and systolic function. moderately large circumferential but more posterior pericardial effusion. No definite tamponade on this study    DVT ppx:  early ambulation  Code status: Full code    Plan discharge pend w/u and plans from consultants    Plan of care discussed with: patient, wife bedside, Dr Loco Alonzo and bedside RN    CT:  Impression   Cardiac size nonenlarged with pericardial effusion most notable along the   right atrium margin up to 3 cm thickness.  Along the left IntraVENous Q8H    sodium chloride flush  10 mL IntraVENous 2 times per day     PRN Meds: perflutren lipid microspheres, melatonin, sennosides-docusate sodium, sodium chloride, oxyCODONE **OR** oxyCODONE, sodium chloride flush, sodium chloride, ondansetron    I/O    Intake/Output Summary (Last 24 hours) at 3/23/2023 1440  Last data filed at 3/23/2023 0636  Gross per 24 hour   Intake 3867.23 ml   Output --   Net 3867.23 ml       Labs:   Recent Labs     03/22/23  0443 03/23/23  0754   WBC 6.5 7.0   HGB 13.2 13.3   HCT 40.7 40.3    241       Recent Labs     03/22/23  0443 03/23/23  0754    137   K 4.8 4.0    105   CO2 24 22   BUN 11 10   CREATININE 0.8 0.8   CALCIUM 9.0 8.8       Recent Labs     03/23/23  0754   PROT 7.0   ALKPHOS 121   ALT 8   AST 12   BILITOT 0.5       Recent Labs     03/22/23  0853   INR 1.6       Recent Labs     03/23/23  0754   CKTOTAL 160       Chronic labs:  Lab Results   Component Value Date    INR 1.6 03/22/2023       Radiology:  Imaging studies reviewed today. Subjective:     Juan Pablo Gordon III is doing ok. No abdominal pain    Objective:     Physical Exam:   /68   Pulse 79   Temp 97.4 °F (36.3 °C) (Temporal)   Resp 17   Ht 5' 11\" (1.803 m)   Wt 165 lb (74.8 kg)   SpO2 96%   BMI 23.01 kg/m²     General appearance:in no distress   Lungs: diminished on L base, no wheezing or crackles   Heart: Regular rate and rhythm, S1, S2 normal   Abdomen: Soft, non-tender and not-distended.  Bowel sounds normal.   Extremities: no edema   Skin: Skin color, texture, turgor normal. No rashes or lesions   Neurologic: Grossly normal and non focal, CN II-XII intact       Connor Galvan MD   Hospitalist Service   03/23/23 2:40 PM

## 2023-03-23 NOTE — PROGRESS NOTES
Pharmacy Consultation Note  (Antibiotic Dosing and Monitoring)    Initial consult date: 3/23/23  Consulting physician/provider: Ezra Flannery MD  Drug: Vancomycin  Indication: Pneumonia (CAP); 7 days    Age/  Gender Height Weight IBW  Allergy Information   23 y.o./male 5' 11\" (180.3 cm) 165 lb (74.8 kg)     Ideal body weight: 75.3 kg (166 lb 0.1 oz)   Patient has no known allergies. Renal Function:  Recent Labs     03/22/23  0443 03/23/23  0754   BUN 11 10   CREATININE 0.8 0.8       Intake/Output Summary (Last 24 hours) at 3/23/2023 1605  Last data filed at 3/23/2023 0636  Gross per 24 hour   Intake 3867.23 ml   Output --   Net 3867.23 ml       Vancomycin Monitoring:  Trough:  No results for input(s): VANCOTROUGH in the last 72 hours. Random:  No results for input(s): VANCORANDOM in the last 72 hours. No results for input(s): Chalmer Stacks in the last 72 hours. Historical Cultures:  No results found for: ORG  No results for input(s): BC in the last 72 hours. Vancomycin Administration Times:  Recent vancomycin administrations        No vancomycin IV orders with administrations found. Orders not given:            vancomycin (VANCOCIN) 1,750 mg in sodium chloride 0.9 % 500 mL IVPB    vancomycin 1500 mg in sodium chloride 0.9% 300 mL IVPB                    Assessment:  Patient is a 21 y.o. male who has been initiated on vancomycin  Estimated Creatinine Clearance: 152 mL/min (based on SCr of 0.8 mg/dL). To dose vancomycin, pharmacy will be utilizing [employer] calculation software for goal AUC/HENRY 400-600 mg/L-hr    Plan:  Give vancomycin 1750 mg IV x1.   Will initiate vancomycin 1500 mg IV q12h (eAUC 555mg/L.hr, eTr 15.6mg/L)  Will check vancomycin levels when appropriate  Will continue to monitor renal function   Pharmacy to follow      Isaak Snider [facility] 3/23/2023 4:05 PM

## 2023-03-23 NOTE — CONSULTS
CTS Consult    Patient name: Herman Alvares III    Reason for consult: pleural/pericardial effusion    Referring Physician: Vitor Díaz    Primary Care Physician: No primary care provider on file. Date of service: 3/23/2023    Chief Complaint: abdominal pain    HPI: 21yo M with no significant PMH presented to OSH with CC of abdominal pain. He was found to have acute appendicitis. He was transferred to [address] [place] [address] for management. During his work-up, he was found to have a left pleural as well as a pericardial effusion. CTS was consulted for recommendations. Currently, he states his abdominal pain has improved. He denies chest pain or shortness of breath. He is on room air. He does admit to vaping. Denies recent respiratory issues including URI symptoms. An attempt at thoracentesis was performed yesterday with no significant output.     Allergies: No Known Allergies    Home medications:    Current Facility-Administered Medications   Medication Dose Route Frequency Provider Last Rate Last Admin    oxyCODONE (ROXICODONE) immediate release tablet 5 mg  5 mg Oral Q4H PRN Uriah Law, DO   5 mg at 03/22/23 1631    Or    oxyCODONE HCl (OXY-IR) immediate release tablet 10 mg  10 mg Oral Q4H PRN Uriah Law, DO   10 mg at 03/22/23 2206    cefTRIAXone (ROCEPHIN) 2,000 mg in sterile water 20 mL IV syringe  2,000 mg IntraVENous Q24H Gautam Jones MD   2,000 mg at 03/22/23 1632    metronidazole (FLAGYL) 500 mg in 0.9% NaCl 100 mL IVPB premix  500 mg IntraVENous Q8H Gautam Jones MD   Stopped at 03/23/23 0151    HYDROmorphone (DILAUDID) injection 0.25 mg  0.25 mg IntraVENous Q4H PRN Gautam Jones MD   0.25 mg at 03/21/23 1711    0.9 % sodium chloride infusion   IntraVENous Continuous Gautam Jones  mL/hr at 03/23/23 0223 New Bag at 03/23/23 0223    sodium chloride flush 0.9 % injection 10 mL  10 mL IntraVENous 2 times per day Gautam Jones MD        sodium chloride flush 0.9 % injection 10 mL  10 mL presented to OSH with CC of abdominal pain. He was found to have acute appendicitis. He was transferred to [address] [place] [address] for management. During his work-up, he was found to have a left pleural as well as a pericardial effusion. CTS was consulted for recommendations. Plan: Recommend TTE to evaluate pericardial effusion  At the least, he will likely need a L VATS, drainage of pleural effusion, decortication  Depending on findings of TTE, may also need subxiphoid pericardial window  Timing of intervention TBD, likely tomorrow    All risks, benefits, alternatives and potential complications explained thoroughly including, but not limited to, bleeding, infection, lung injury, kidney injury, stroke, heart attack, prolonged ventilation, wound complication, need for re-operation, and death, and the patient agrees to proceed.       Electronically signed by Katelynn Reyez DO on 3/23/2023 at 8:32 AM

## 2023-03-23 NOTE — ED PROVIDER NOTES
[address]        Pt Name: Geovanny Osorio  MRN: [id number]  [place] 2000  Date of evaluation: 3/21/2023  Provider: Shira Reyes DO  PCP: No primary care provider on file. Note Started: 7:46 AM EDT 3/23/23    CHIEF COMPLAINT       Chief Complaint   Patient presents with    Abdominal Pain     Transfer from [facility] for acute appendicitis and pulmonary edema concern for lymphoma       HISTORY OF PRESENT ILLNESS: 1 or more Elements       Darrel Encinas III is a 21 y.o. male who presents to the emergency department with a chief complaint of appendicitis. The patient states that he began approximately 24 hours prior to arrival with abdominal pain. The pain is located in right lower quadrant. Sharp in nature. As makes better. Worse with palpation. The patient was evaluated at outlying emergency department found to have leukocytosis of 16,000 as well as acute appendicitis. The patient did also have pericardial effusion as well as pleural effusion noted on the CT was sent to our facility for further treatment and evaluation. Nursing Notes were all reviewed and agreed with or any disagreements were addressed in the HPI. REVIEW OF SYSTEMS :      Review of Systems   Constitutional:  Negative for fever. HENT:  Negative for congestion. Eyes:  Negative for redness. Respiratory:  Negative for shortness of breath. Cardiovascular:  Negative for chest pain. Gastrointestinal:  Positive for abdominal pain. Negative for nausea and vomiting. Genitourinary:  Negative for dysuria. Musculoskeletal:  Negative for arthralgias. Skin:  Negative for rash. Neurological:  Negative for dizziness. Psychiatric/Behavioral:  Negative for confusion. All other systems reviewed and are negative. Positives and Pertinent negatives as per HPI. SURGICAL HISTORY   No past surgical history on file.     [address]       Current Discharge Medication List        CONTINUE these medications which have NOT CHANGED    Details   Lisdexamfetamine Dimesylate (VYVANSE) 60 MG CAPS Take 60 mg by mouth daily. ALLERGIES     Patient has no known allergies. FAMILYHISTORY     No family history on file. SOCIAL HISTORY       Social History     Tobacco Use    Smoking status: Never   Vaping Use    Vaping Use: Every day    Substances: Nicotine, Flavoring    Devices: Disposable, Pre-filled or refillable cartridge   Substance Use Topics    Alcohol use: Defer    Drug use: Defer       SCREENINGS        Annita Coma Scale  Eye Opening: Spontaneous  Best Verbal Response: Oriented  Best Motor Response: Obeys commands  [place] Coma Scale Score: 15                CIWA Assessment  BP: 101/68  Heart Rate: 79           PHYSICAL EXAM  1 or more Elements     ED Triage Vitals   BP Temp Temp Source Heart Rate Resp SpO2 Height Weight   03/21/23 1540 03/21/23 1540 03/21/23 2236 03/21/23 1540 03/21/23 1540 03/21/23 1540 03/21/23 2230 03/21/23 2230   (!) 107/59 98.7 °F (37.1 °C) Temporal 75 18 96 % 5' 11\" (1.803 m) 165 lb (74.8 kg)         Constitutional/General: Alert and oriented x3, well appearing, non toxic in NAD  Head: NC/AT  Eyes:  EOMI  Mouth: Oropharynx clear, handling secretions, no trismus  Neck: Supple, full ROM  Pulmonary: Lungs clear to auscultation bilaterally, no wheezes, rales, or rhonchi. Not in respiratory distress  Cardiovascular:  Regular rate and rhythm, no murmurs, gallops, or rubs. 2+ distal pulses  Abdomen: Soft, right lower quadrant, no rebound, rigidity or guarding  Extremities: Moves all extremities x 4. Warm and well perfused  Skin: warm and dry without rash  Neurologic: GCS 15, no gross focal neurologic deficits  Psych: Normal Affect      DIAGNOSTIC RESULTS     I have personally reviewed all laboratory and imaging results for this patient. Results are listed below.      LABS:  Results for orders placed or performed during the hospital

## 2023-03-23 NOTE — CONSULTS
quadrant. . No masses felt. No hepatosplenomegaly. Genitourinary: Deferred  Extremities: No clubbing, no cyanosis, no edema.   Musculoskeletal: Pain in range of motion of any joints  Neurological: Following commands, no focal neurodeficit  Lines: peripheral      CBC+dif:  Recent Labs     03/22/23  0443 03/23/23  0754   WBC 6.5 7.0   HGB 13.2 13.3   HCT 40.7 40.3   MCV 89.3 87.8    241   NEUTROABS 4.86 5.58     Lab Results   Component Value Date    CRP 4.8 (H) 03/23/2023     No results found for: CRPHS  Lab Results   Component Value Date    SEDRATE 20 (H) 03/23/2023     Lab Results   Component Value Date    ALT 8 03/23/2023    AST 12 03/23/2023    ALKPHOS 121 03/23/2023    BILITOT 0.5 03/23/2023     Lab Results   Component Value Date/Time     03/23/2023 07:54 AM    K 4.0 03/23/2023 07:54 AM     03/23/2023 07:54 AM    CO2 22 03/23/2023 07:54 AM    BUN 10 03/23/2023 07:54 AM    CREATININE 0.8 03/23/2023 07:54 AM    LABGLOM >60 03/23/2023 07:54 AM    GLUCOSE 83 03/23/2023 07:54 AM    PROT 7.0 03/23/2023 07:54 AM    LABALBU 3.7 03/23/2023 07:54 AM    CALCIUM 8.8 03/23/2023 07:54 AM    BILITOT 0.5 03/23/2023 07:54 AM    ALKPHOS 121 03/23/2023 07:54 AM    AST 12 03/23/2023 07:54 AM    ALT 8 03/23/2023 07:54 AM       Lab Results   Component Value Date/Time    PROTIME 17.6 03/22/2023 08:53 AM    INR 1.6 03/22/2023 08:53 AM       No results found for: TSH    No results found for: NITRITE, COLORU, PHUR, LABCAST, WBCUA, RBCUA, MUCUS, TRICHOMONAS, YEAST, BACTERIA, CLARITYU, SPECGRAV, LEUKOCYTESUR, UROBILINOGEN, BILIRUBINUR, BLOODU, GLUCOSEU, AMORPHOUS    No results found for: [place], BEART, [id number], PHART, THGBART, YGQ5QQR, PO2ART, SCD1IKW  Radiology:  XR CHEST PORTABLE   Final Result   No interval change from 03/22/2023 exam         XR CHEST PORTABLE   Final Result   There is significant pleural thickening and consolidative airspace disease   projecting over the entire left hemithorax with only a small

## 2023-03-23 NOTE — CARE COORDINATION
Patient had failed thoracentesis  as patient had a vagal episode and procedure was stopped. CT surgery consulted.3/22. Patient is NPO and remains on IV Flagyl and IV Rocephin. Chest Xray showed nom changes from previous one. Left hemithorax. Patient 's discharge plan is home and has a ride per previous SW note. Will address possible need for [place] [id number] for IV antibiotics .  CM/SW will continue to follow

## 2023-03-23 NOTE — PROGRESS NOTES
Discussed findings of TTE with patient and family at bedside. Also discussed the findings of CT and recommendations. Discussed TTE with cardiology. Will hold on pericardial window at this time as he has no signs of tamponade clinically or on TTE  OR tomorrow for L VATS, drainage of effusion, biopsy, possible decortication  All risks, benefits, alternatives and potential complications explained thoroughly including, but not limited to, bleeding, infection, lung injury, kidney injury, stroke, heart attack, prolonged ventilation, wound complication, need for re-operation, and death, and the patient agrees to proceed. Informed consent obtained, consent signed and left in the chart. Patient marked.     [address]  Cardiothoracic Surgery

## 2023-03-23 NOTE — PROGRESS NOTES
Nurse to nurse called to [facility]. All questions answered. Patient transferred to [facility] with belongings.

## 2023-03-23 NOTE — CONSULTS
Corrina Lopez III  2000  Date of Service: 3/23/2023    Reason for Consultation: We were asked to see Corrina Lopez III by Dr. Kari Gardiner primary care provider on file. regarding pericardial effusion. History of Chief Complaint: This is a 21 y.o. male previously known to our group. They have a history of ADHD. He also had Lyme disease 2019. He presented to [facility] with right lower quadrant pain and was found to have an acute appendicitis. However, the CAT scan showed a large left pleural effusion and a pericardial effusion. Therefore he was transferred here. A thoracentesis was attempted but was unsuccessful. Cardiothoracic surgery has been consulted for possible VATS procedure and possible pericardiocentesis/window. Cardiology is now consulted for the pericardial effusion as well. Upon my arrival, he was lying flat in bed. He was comfortable and in no distress. He denies any chest discomfort, dyspnea on exertion, orthopnea/PND. He denies any palpitations, or syncope, or near syncope. REVIEW OF SYSTEMS:   Heart: as above   Lungs: as above   Eyes: denies changes in vision or discharge. Ears: denies changes in hearing or pain. Nose: denies epistaxis or masses   Throat: denies sore throat or trouble swallowing. Neuro: denies numbness, tingling, tremors. Skin: denies rashes or itching. : denies hematuria, dysuria   GI: denies vomiting, diarrhea   Psych: denies mood changed, anxiety, depression.     CURRENT MEDICATIONS:  Current Facility-Administered Medications   Medication Dose Route Frequency Provider Last Rate Last Admin    perflutren lipid microspheres (DEFINITY) injection 1.5 mL  1.5 mL IntraVENous ONCE PRN MOHINDER Price        lactobacillus (CULTURELLE) capsule 1 capsule  1 capsule Oral TID Quynh Elias MD   1 capsule at 03/23/23 1143    melatonin disintegrating tablet 5 mg  5 mg Oral Nightly Quynh Elias MD        melatonin disintegrating file.    FAMILY HISTORY:  No family history on file. SOCIAL HISTORY:  Social History     Socioeconomic History    Marital status: Single     Spouse name: None    Number of children: None    Years of education: None    Highest education level: None   Tobacco Use    Smoking status: Never   Vaping Use    Vaping Use: Every day    Substances: Nicotine, Flavoring    Devices: Disposable, Pre-filled or refillable cartridge   Substance and Sexual Activity    Alcohol use: Defer    Drug use: Defer       PHYSICAL EXAM:  Vitals:    03/22/23 1430 03/22/23 1534 03/23/23 0613 03/23/23 0725   BP: 100/60 118/68 112/63 101/68   Pulse: 60 65 83 79   Resp:   17 17   Temp:   97.8 °F (36.6 °C) 97.4 °F (36.3 °C)   TempSrc:   Temporal Temporal   SpO2: 95%  91% 96%   Weight:       Height:           GENERAL:  He is alert and oriented x 3, communicates well, in no distress. NECK:  No masses, trachea is mid position. Supple, full ROM, no JVD or bruits. No palpable thyromegaly or lymphadenopathy. HEART:  Regular rate and rhythm. Normal S1 and S2. There are no abnormal murmurs. No heaves. LUNGS:  Clear to auscultation bilaterally. No use of accessory muscles. ABDOMEN:  Soft, non-tender. Normal bowel sounds. EXTREMITIES:  Full ROM x4. No bilateral lower extremity edema. Good distal pulses. EYES:  PERRL, normal lids & conjunctiva. No icterus. ENT: no external masses, no bleeding. Moist mucosa. Normal lips formation. NEURO: Full ROM x 4, EOMI, no tremors. SKIN:  Warm, dry and intact. Normal turgor, no petechia. Phych: alert & oriented x3. Normal  Judgement & insight. Currently not agitated or anxious.        EKG:     Labs:  Recent Labs     03/22/23  0443 03/23/23  0754   WBC 6.5 7.0   HGB 13.2 13.3   HCT 40.7 40.3   MCV 89.3 87.8    241     Recent Labs     03/22/23  0443 03/23/23  0754    137   K 4.8 4.0    105   CO2 24 22   BUN 11 10   CREATININE 0.8 0.8   MG  --  2.0     Recent Labs     03/22/23  0853

## 2023-03-24 ENCOUNTER — ANESTHESIA (OUTPATIENT)
Dept: OPERATING ROOM | Age: 23
End: 2023-03-24
Payer: MEDICAID

## 2023-03-24 ENCOUNTER — APPOINTMENT (OUTPATIENT)
Dept: GENERAL RADIOLOGY | Age: 23
DRG: 121 | End: 2023-03-24
Payer: MEDICAID

## 2023-03-24 PROBLEM — I31.39 PERICARDIAL EFFUSION: Status: ACTIVE | Noted: 2023-03-24

## 2023-03-24 PROBLEM — Z86.19 H/O LYME DISEASE: Status: ACTIVE | Noted: 2023-03-24

## 2023-03-24 PROBLEM — Z87.891 EX-SMOKER: Status: ACTIVE | Noted: 2023-03-24

## 2023-03-24 PROBLEM — T36.8X5A VANCOMYCIN ADVERSE REACTION: Status: ACTIVE | Noted: 2023-03-24

## 2023-03-24 PROBLEM — Z72.89 CURRENT EVERY DAY VAPING: Status: ACTIVE | Noted: 2023-03-24

## 2023-03-24 PROBLEM — F90.9 ADHD: Status: ACTIVE | Noted: 2023-03-24

## 2023-03-24 LAB
ANA SER QL: NEGATIVE
ANION GAP SERPL CALCULATED.3IONS-SCNC: 12 MMOL/L (ref 7–16)
BUN SERPL-MCNC: 9 MG/DL (ref 6–20)
CALCIUM SERPL-MCNC: 8.2 MG/DL (ref 8.6–10.2)
CHLORIDE SERPL-SCNC: 110 MMOL/L (ref 98–107)
CO2 SERPL-SCNC: 19 MMOL/L (ref 22–29)
CREAT SERPL-MCNC: 0.8 MG/DL (ref 0.7–1.2)
ERYTHROCYTE [DISTWIDTH] IN BLOOD BY AUTOMATED COUNT: 13.6 FL (ref 11.5–15)
GLUCOSE SERPL-MCNC: 158 MG/DL (ref 74–99)
HAV IGM SERPL QL IA: NORMAL
HBV CORE IGM SERPL QL IA: NORMAL
HBV SURFACE AG SERPL QL IA: NORMAL
HCT VFR BLD AUTO: 42.9 % (ref 37–54)
HCT VFR BLD AUTO: 43 % (ref 37–54)
HCT VFR BLD AUTO: 43.7 % (ref 37–54)
HCV AB SERPL QL IA: NORMAL
HGB BLD-MCNC: 13.8 G/DL (ref 12.5–16.5)
HGB BLD-MCNC: 13.9 G/DL (ref 12.5–16.5)
HGB BLD-MCNC: 14.3 G/DL (ref 12.5–16.5)
HIV1+2 AB SERPL QL IA: NORMAL
MAGNESIUM SERPL-MCNC: 1.8 MG/DL (ref 1.6–2.6)
MCH RBC QN AUTO: 28.9 PG (ref 26–35)
MCHC RBC AUTO-ENTMCNC: 32.2 % (ref 32–34.5)
MCV RBC AUTO: 89.9 FL (ref 80–99.9)
PLATELET # BLD AUTO: 299 E9/L (ref 130–450)
PMV BLD AUTO: 9.4 FL (ref 7–12)
POTASSIUM SERPL-SCNC: 4.4 MMOL/L (ref 3.5–5)
RBC # BLD AUTO: 4.77 E12/L (ref 3.8–5.8)
RPR SER QL: NORMAL
SODIUM SERPL-SCNC: 141 MMOL/L (ref 132–146)
WBC # BLD: 12.2 E9/L (ref 4.5–11.5)

## 2023-03-24 PROCEDURE — 6370000000 HC RX 637 (ALT 250 FOR IP): Performed by: NURSE PRACTITIONER

## 2023-03-24 PROCEDURE — 2500000003 HC RX 250 WO HCPCS: Performed by: STUDENT IN AN ORGANIZED HEALTH CARE EDUCATION/TRAINING PROGRAM

## 2023-03-24 PROCEDURE — 94664 DEMO&/EVAL PT USE INHALER: CPT

## 2023-03-24 PROCEDURE — 85014 HEMATOCRIT: CPT

## 2023-03-24 PROCEDURE — 36415 COLL VENOUS BLD VENIPUNCTURE: CPT

## 2023-03-24 PROCEDURE — 3700000001 HC ADD 15 MINUTES (ANESTHESIA): Performed by: STUDENT IN AN ORGANIZED HEALTH CARE EDUCATION/TRAINING PROGRAM

## 2023-03-24 PROCEDURE — 88305 TISSUE EXAM BY PATHOLOGIST: CPT

## 2023-03-24 PROCEDURE — 87070 CULTURE OTHR SPECIMN AEROBIC: CPT

## 2023-03-24 PROCEDURE — 6360000002 HC RX W HCPCS: Performed by: ANESTHESIOLOGY

## 2023-03-24 PROCEDURE — 87075 CULTR BACTERIA EXCEPT BLOOD: CPT

## 2023-03-24 PROCEDURE — 6360000002 HC RX W HCPCS: Performed by: NURSE PRACTITIONER

## 2023-03-24 PROCEDURE — 2580000003 HC RX 258: Performed by: INTERNAL MEDICINE

## 2023-03-24 PROCEDURE — 88112 CYTOPATH CELL ENHANCE TECH: CPT

## 2023-03-24 PROCEDURE — 3600000016 HC SURGERY LEVEL 6 ADDTL 15MIN: Performed by: STUDENT IN AN ORGANIZED HEALTH CARE EDUCATION/TRAINING PROGRAM

## 2023-03-24 PROCEDURE — 87102 FUNGUS ISOLATION CULTURE: CPT

## 2023-03-24 PROCEDURE — 2580000003 HC RX 258: Performed by: NURSE PRACTITIONER

## 2023-03-24 PROCEDURE — 87305 ASPERGILLUS AG IA: CPT

## 2023-03-24 PROCEDURE — 6360000002 HC RX W HCPCS

## 2023-03-24 PROCEDURE — 3700000000 HC ANESTHESIA ATTENDED CARE: Performed by: STUDENT IN AN ORGANIZED HEALTH CARE EDUCATION/TRAINING PROGRAM

## 2023-03-24 PROCEDURE — C1729 CATH, DRAINAGE: HCPCS | Performed by: STUDENT IN AN ORGANIZED HEALTH CARE EDUCATION/TRAINING PROGRAM

## 2023-03-24 PROCEDURE — 87206 SMEAR FLUORESCENT/ACID STAI: CPT

## 2023-03-24 PROCEDURE — 80048 BASIC METABOLIC PNL TOTAL CA: CPT

## 2023-03-24 PROCEDURE — 2060000000 HC ICU INTERMEDIATE R&B

## 2023-03-24 PROCEDURE — 71045 X-RAY EXAM CHEST 1 VIEW: CPT

## 2023-03-24 PROCEDURE — 86703 HIV-1/HIV-2 1 RESULT ANTBDY: CPT

## 2023-03-24 PROCEDURE — 7100000001 HC PACU RECOVERY - ADDTL 15 MIN: Performed by: STUDENT IN AN ORGANIZED HEALTH CARE EDUCATION/TRAINING PROGRAM

## 2023-03-24 PROCEDURE — 87116 MYCOBACTERIA CULTURE: CPT

## 2023-03-24 PROCEDURE — 85018 HEMOGLOBIN: CPT

## 2023-03-24 PROCEDURE — 88331 PATH CONSLTJ SURG 1 BLK 1SPC: CPT

## 2023-03-24 PROCEDURE — 2580000003 HC RX 258

## 2023-03-24 PROCEDURE — 94640 AIRWAY INHALATION TREATMENT: CPT

## 2023-03-24 PROCEDURE — 83735 ASSAY OF MAGNESIUM: CPT

## 2023-03-24 PROCEDURE — 3600000006 HC SURGERY LEVEL 6 BASE: Performed by: STUDENT IN AN ORGANIZED HEALTH CARE EDUCATION/TRAINING PROGRAM

## 2023-03-24 PROCEDURE — 2500000003 HC RX 250 WO HCPCS: Performed by: NURSE PRACTITIONER

## 2023-03-24 PROCEDURE — 87205 SMEAR GRAM STAIN: CPT

## 2023-03-24 PROCEDURE — 88304 TISSUE EXAM BY PATHOLOGIST: CPT

## 2023-03-24 PROCEDURE — 0BBP4ZX EXCISION OF LEFT PLEURA, PERCUTANEOUS ENDOSCOPIC APPROACH, DIAGNOSTIC: ICD-10-PCS | Performed by: STUDENT IN AN ORGANIZED HEALTH CARE EDUCATION/TRAINING PROGRAM

## 2023-03-24 PROCEDURE — 7100000000 HC PACU RECOVERY - FIRST 15 MIN: Performed by: STUDENT IN AN ORGANIZED HEALTH CARE EDUCATION/TRAINING PROGRAM

## 2023-03-24 PROCEDURE — 6360000002 HC RX W HCPCS: Performed by: INTERNAL MEDICINE

## 2023-03-24 PROCEDURE — 32609 THORACOSCOPY W/BX PLEURA: CPT | Performed by: STUDENT IN AN ORGANIZED HEALTH CARE EDUCATION/TRAINING PROGRAM

## 2023-03-24 PROCEDURE — 2709999900 HC NON-CHARGEABLE SUPPLY: Performed by: STUDENT IN AN ORGANIZED HEALTH CARE EDUCATION/TRAINING PROGRAM

## 2023-03-24 PROCEDURE — 87449 NOS EACH ORGANISM AG IA: CPT

## 2023-03-24 PROCEDURE — 85027 COMPLETE CBC AUTOMATED: CPT

## 2023-03-24 PROCEDURE — 2500000003 HC RX 250 WO HCPCS

## 2023-03-24 RX ORDER — PROPOFOL 10 MG/ML
INJECTION, EMULSION INTRAVENOUS PRN
Status: DISCONTINUED | OUTPATIENT
Start: 2023-03-24 | End: 2023-03-24 | Stop reason: SDUPTHER

## 2023-03-24 RX ORDER — NALOXONE HYDROCHLORIDE 0.4 MG/ML
INJECTION, SOLUTION INTRAMUSCULAR; INTRAVENOUS; SUBCUTANEOUS PRN
Status: DISCONTINUED | OUTPATIENT
Start: 2023-03-24 | End: 2023-03-26

## 2023-03-24 RX ORDER — MECOBALAMIN 5000 MCG
5 TABLET,DISINTEGRATING ORAL NIGHTLY PRN
Status: DISCONTINUED | OUTPATIENT
Start: 2023-03-25 | End: 2023-03-31 | Stop reason: HOSPADM

## 2023-03-24 RX ORDER — FENTANYL CITRATE 50 UG/ML
INJECTION, SOLUTION INTRAMUSCULAR; INTRAVENOUS PRN
Status: DISCONTINUED | OUTPATIENT
Start: 2023-03-24 | End: 2023-03-24 | Stop reason: SDUPTHER

## 2023-03-24 RX ORDER — ACETAMINOPHEN 325 MG/1
650 TABLET ORAL EVERY 6 HOURS
Status: DISCONTINUED | OUTPATIENT
Start: 2023-03-24 | End: 2023-03-31 | Stop reason: HOSPADM

## 2023-03-24 RX ORDER — ONDANSETRON 2 MG/ML
4 INJECTION INTRAMUSCULAR; INTRAVENOUS
Status: DISCONTINUED | OUTPATIENT
Start: 2023-03-24 | End: 2023-03-24 | Stop reason: HOSPADM

## 2023-03-24 RX ORDER — MINERAL OIL AND WHITE PETROLATUM 150; 830 MG/G; MG/G
OINTMENT OPHTHALMIC PRN
Status: DISCONTINUED | OUTPATIENT
Start: 2023-03-24 | End: 2023-03-31 | Stop reason: HOSPADM

## 2023-03-24 RX ORDER — CEFAZOLIN SODIUM 1 G/3ML
INJECTION, POWDER, FOR SOLUTION INTRAMUSCULAR; INTRAVENOUS PRN
Status: DISCONTINUED | OUTPATIENT
Start: 2023-03-24 | End: 2023-03-24 | Stop reason: SDUPTHER

## 2023-03-24 RX ORDER — DIPHENHYDRAMINE HYDROCHLORIDE 50 MG/ML
25 INJECTION INTRAMUSCULAR; INTRAVENOUS EVERY 6 HOURS PRN
Status: DISCONTINUED | OUTPATIENT
Start: 2023-03-24 | End: 2023-03-31 | Stop reason: HOSPADM

## 2023-03-24 RX ORDER — POLYETHYLENE GLYCOL 3350 17 G/17G
17 POWDER, FOR SOLUTION ORAL DAILY PRN
Status: DISCONTINUED | OUTPATIENT
Start: 2023-03-24 | End: 2023-03-26

## 2023-03-24 RX ORDER — SODIUM CHLORIDE 0.9 % (FLUSH) 0.9 %
5-40 SYRINGE (ML) INJECTION EVERY 12 HOURS SCHEDULED
Status: DISCONTINUED | OUTPATIENT
Start: 2023-03-24 | End: 2023-03-24 | Stop reason: HOSPADM

## 2023-03-24 RX ORDER — LIDOCAINE HYDROCHLORIDE 10 MG/ML
INJECTION, SOLUTION EPIDURAL; INFILTRATION; INTRACAUDAL; PERINEURAL
Status: DISCONTINUED
Start: 2023-03-24 | End: 2023-03-24

## 2023-03-24 RX ORDER — MORPHINE SULFATE 2 MG/ML
2 INJECTION, SOLUTION INTRAMUSCULAR; INTRAVENOUS EVERY 4 HOURS PRN
Status: CANCELLED | OUTPATIENT
Start: 2023-03-24

## 2023-03-24 RX ORDER — BISACODYL 5 MG/1
5 TABLET, DELAYED RELEASE ORAL DAILY PRN
Status: DISCONTINUED | OUTPATIENT
Start: 2023-03-24 | End: 2023-03-31 | Stop reason: HOSPADM

## 2023-03-24 RX ORDER — SODIUM CHLORIDE 9 MG/ML
INJECTION, SOLUTION INTRAVENOUS PRN
Status: DISCONTINUED | OUTPATIENT
Start: 2023-03-24 | End: 2023-03-24 | Stop reason: HOSPADM

## 2023-03-24 RX ORDER — SODIUM CHLORIDE 9 MG/ML
INJECTION, SOLUTION INTRAVENOUS CONTINUOUS PRN
Status: DISCONTINUED | OUTPATIENT
Start: 2023-03-24 | End: 2023-03-24 | Stop reason: SDUPTHER

## 2023-03-24 RX ORDER — LIDOCAINE HYDROCHLORIDE 20 MG/ML
INJECTION, SOLUTION INTRAVENOUS PRN
Status: DISCONTINUED | OUTPATIENT
Start: 2023-03-24 | End: 2023-03-24 | Stop reason: SDUPTHER

## 2023-03-24 RX ORDER — DEXAMETHASONE SODIUM PHOSPHATE 10 MG/ML
INJECTION INTRAMUSCULAR; INTRAVENOUS PRN
Status: DISCONTINUED | OUTPATIENT
Start: 2023-03-24 | End: 2023-03-24 | Stop reason: SDUPTHER

## 2023-03-24 RX ORDER — BUPIVACAINE HYDROCHLORIDE AND EPINEPHRINE 5; 5 MG/ML; UG/ML
INJECTION, SOLUTION EPIDURAL; INTRACAUDAL; PERINEURAL PRN
Status: DISCONTINUED | OUTPATIENT
Start: 2023-03-24 | End: 2023-03-24 | Stop reason: ALTCHOICE

## 2023-03-24 RX ORDER — SODIUM CHLORIDE 0.9 % (FLUSH) 0.9 %
5-40 SYRINGE (ML) INJECTION PRN
Status: DISCONTINUED | OUTPATIENT
Start: 2023-03-24 | End: 2023-03-24 | Stop reason: HOSPADM

## 2023-03-24 RX ORDER — MIDAZOLAM HYDROCHLORIDE 1 MG/ML
INJECTION INTRAMUSCULAR; INTRAVENOUS PRN
Status: DISCONTINUED | OUTPATIENT
Start: 2023-03-24 | End: 2023-03-24 | Stop reason: SDUPTHER

## 2023-03-24 RX ORDER — MEPERIDINE HYDROCHLORIDE 25 MG/ML
12.5 INJECTION INTRAMUSCULAR; INTRAVENOUS; SUBCUTANEOUS
Status: DISCONTINUED | OUTPATIENT
Start: 2023-03-24 | End: 2023-03-24 | Stop reason: HOSPADM

## 2023-03-24 RX ORDER — ROCURONIUM BROMIDE 10 MG/ML
INJECTION, SOLUTION INTRAVENOUS PRN
Status: DISCONTINUED | OUTPATIENT
Start: 2023-03-24 | End: 2023-03-24 | Stop reason: SDUPTHER

## 2023-03-24 RX ORDER — IPRATROPIUM BROMIDE AND ALBUTEROL SULFATE 2.5; .5 MG/3ML; MG/3ML
1 SOLUTION RESPIRATORY (INHALATION)
Status: DISCONTINUED | OUTPATIENT
Start: 2023-03-24 | End: 2023-03-26

## 2023-03-24 RX ORDER — ONDANSETRON 2 MG/ML
INJECTION INTRAMUSCULAR; INTRAVENOUS PRN
Status: DISCONTINUED | OUTPATIENT
Start: 2023-03-24 | End: 2023-03-24 | Stop reason: SDUPTHER

## 2023-03-24 RX ADMIN — FENTANYL CITRATE 50 MCG: 50 INJECTION, SOLUTION INTRAMUSCULAR; INTRAVENOUS at 09:42

## 2023-03-24 RX ADMIN — FENTANYL CITRATE 50 MCG: 50 INJECTION, SOLUTION INTRAMUSCULAR; INTRAVENOUS at 07:55

## 2023-03-24 RX ADMIN — Medication 1 CAPSULE: at 20:43

## 2023-03-24 RX ADMIN — COLCHICINE 0.6 MG: 0.6 TABLET ORAL at 20:45

## 2023-03-24 RX ADMIN — FENTANYL CITRATE 50 MCG: 50 INJECTION, SOLUTION INTRAMUSCULAR; INTRAVENOUS at 07:40

## 2023-03-24 RX ADMIN — DIPHENHYDRAMINE HYDROCHLORIDE 25 MG: 50 INJECTION, SOLUTION INTRAMUSCULAR; INTRAVENOUS at 17:26

## 2023-03-24 RX ADMIN — FENTANYL CITRATE 100 MCG: 50 INJECTION, SOLUTION INTRAMUSCULAR; INTRAVENOUS at 07:05

## 2023-03-24 RX ADMIN — MUPIROCIN: 20 OINTMENT TOPICAL at 16:07

## 2023-03-24 RX ADMIN — PROPOFOL 200 MG: 10 INJECTION, EMULSION INTRAVENOUS at 07:00

## 2023-03-24 RX ADMIN — METRONIDAZOLE 500 MG: 500 INJECTION, SOLUTION INTRAVENOUS at 16:05

## 2023-03-24 RX ADMIN — ONDANSETRON 4 MG: 2 INJECTION INTRAMUSCULAR; INTRAVENOUS at 09:20

## 2023-03-24 RX ADMIN — FENTANYL CITRATE 50 MCG: 50 INJECTION, SOLUTION INTRAMUSCULAR; INTRAVENOUS at 08:20

## 2023-03-24 RX ADMIN — Medication 1 CAPSULE: at 15:01

## 2023-03-24 RX ADMIN — ROCURONIUM BROMIDE 20 MG: 10 INJECTION INTRAVENOUS at 07:40

## 2023-03-24 RX ADMIN — IPRATROPIUM BROMIDE AND ALBUTEROL SULFATE 1 AMPULE: .5; 2.5 SOLUTION RESPIRATORY (INHALATION) at 20:49

## 2023-03-24 RX ADMIN — CEFTRIAXONE SODIUM 2000 MG: 2 INJECTION, POWDER, FOR SOLUTION INTRAMUSCULAR; INTRAVENOUS at 15:57

## 2023-03-24 RX ADMIN — METRONIDAZOLE 500 MG: 500 INJECTION, SOLUTION INTRAVENOUS at 00:53

## 2023-03-24 RX ADMIN — IPRATROPIUM BROMIDE AND ALBUTEROL SULFATE 1 AMPULE: .5; 2.5 SOLUTION RESPIRATORY (INHALATION) at 16:48

## 2023-03-24 RX ADMIN — SODIUM CHLORIDE: 9 INJECTION, SOLUTION INTRAVENOUS at 09:44

## 2023-03-24 RX ADMIN — SODIUM CHLORIDE: 9 INJECTION, SOLUTION INTRAVENOUS at 06:03

## 2023-03-24 RX ADMIN — Medication: at 10:49

## 2023-03-24 RX ADMIN — LIDOCAINE HYDROCHLORIDE 100 MG: 20 INJECTION, SOLUTION INTRAVENOUS at 07:00

## 2023-03-24 RX ADMIN — ACETAMINOPHEN 650 MG: 325 TABLET ORAL at 15:01

## 2023-03-24 RX ADMIN — ACETAMINOPHEN 650 MG: 325 TABLET ORAL at 20:43

## 2023-03-24 RX ADMIN — FENTANYL CITRATE 50 MCG: 50 INJECTION, SOLUTION INTRAMUSCULAR; INTRAVENOUS at 07:30

## 2023-03-24 RX ADMIN — CEFAZOLIN 2 G: 1 INJECTION, POWDER, FOR SOLUTION INTRAMUSCULAR; INTRAVENOUS at 07:18

## 2023-03-24 RX ADMIN — SODIUM CHLORIDE: 9 INJECTION, SOLUTION INTRAVENOUS at 06:50

## 2023-03-24 RX ADMIN — HYDROMORPHONE HYDROCHLORIDE 0.5 MG: 1 INJECTION, SOLUTION INTRAMUSCULAR; INTRAVENOUS; SUBCUTANEOUS at 10:29

## 2023-03-24 RX ADMIN — ROCURONIUM BROMIDE 10 MG: 10 INJECTION INTRAVENOUS at 08:18

## 2023-03-24 RX ADMIN — DEXAMETHASONE SODIUM PHOSPHATE 10 MG: 10 INJECTION INTRAMUSCULAR; INTRAVENOUS at 07:15

## 2023-03-24 RX ADMIN — HYDROMORPHONE HYDROCHLORIDE 0.5 MG: 1 INJECTION, SOLUTION INTRAMUSCULAR; INTRAVENOUS; SUBCUTANEOUS at 10:22

## 2023-03-24 RX ADMIN — SUGAMMADEX 150 MG: 100 INJECTION, SOLUTION INTRAVENOUS at 09:42

## 2023-03-24 RX ADMIN — VANCOMYCIN HYDROCHLORIDE 1500 MG: 10 INJECTION, POWDER, LYOPHILIZED, FOR SOLUTION INTRAVENOUS at 18:00

## 2023-03-24 RX ADMIN — MIDAZOLAM 2 MG: 1 INJECTION INTRAMUSCULAR; INTRAVENOUS at 06:51

## 2023-03-24 RX ADMIN — MUPIROCIN: 20 OINTMENT TOPICAL at 20:42

## 2023-03-24 RX ADMIN — ROCURONIUM BROMIDE 50 MG: 10 INJECTION INTRAVENOUS at 07:01

## 2023-03-24 RX ADMIN — FENTANYL CITRATE 100 MCG: 50 INJECTION, SOLUTION INTRAMUSCULAR; INTRAVENOUS at 07:00

## 2023-03-24 ASSESSMENT — PAIN SCALES - GENERAL
PAINLEVEL_OUTOF10: 2
PAINLEVEL_OUTOF10: 5
PAINLEVEL_OUTOF10: 0
PAINLEVEL_OUTOF10: 4
PAINLEVEL_OUTOF10: 0
PAINLEVEL_OUTOF10: 4
PAINLEVEL_OUTOF10: 0
PAINLEVEL_OUTOF10: 0
PAINLEVEL_OUTOF10: 4
PAINLEVEL_OUTOF10: 4
PAINLEVEL_OUTOF10: 7
PAINLEVEL_OUTOF10: 4
PAINLEVEL_OUTOF10: 8
PAINLEVEL_OUTOF10: 0
PAINLEVEL_OUTOF10: 7

## 2023-03-24 ASSESSMENT — PAIN DESCRIPTION - ONSET
ONSET: ON-GOING

## 2023-03-24 ASSESSMENT — PAIN DESCRIPTION - FREQUENCY
FREQUENCY: CONTINUOUS
FREQUENCY: INTERMITTENT

## 2023-03-24 ASSESSMENT — PAIN DESCRIPTION - LOCATION
LOCATION: CHEST
LOCATION: INCISION
LOCATION: CHEST
LOCATION: CHEST

## 2023-03-24 ASSESSMENT — PAIN DESCRIPTION - ORIENTATION
ORIENTATION: LEFT

## 2023-03-24 ASSESSMENT — PAIN DESCRIPTION - DESCRIPTORS
DESCRIPTORS: ACHING;SORE;DISCOMFORT
DESCRIPTORS: ACHING;BURNING;CRAMPING
DESCRIPTORS: ACHING;BURNING;CRAMPING
DESCRIPTORS: BURNING;ACHING;CRAMPING
DESCRIPTORS: ACHING;BURNING;CRAMPING
DESCRIPTORS: ACHING;BURNING;CRAMPING

## 2023-03-24 ASSESSMENT — LIFESTYLE VARIABLES: SMOKING_STATUS: 1

## 2023-03-24 ASSESSMENT — PAIN DESCRIPTION - PAIN TYPE
TYPE: SURGICAL PAIN

## 2023-03-24 ASSESSMENT — PAIN SCALES - WONG BAKER: WONGBAKER_NUMERICALRESPONSE: 4

## 2023-03-24 ASSESSMENT — PAIN - FUNCTIONAL ASSESSMENT: PAIN_FUNCTIONAL_ASSESSMENT: PREVENTS OR INTERFERES SOME ACTIVE ACTIVITIES AND ADLS

## 2023-03-24 NOTE — PROGRESS NOTES
Floor telepack/leads remained on patient while in line room.   Pt family took all of pt's belongings

## 2023-03-24 NOTE — PROGRESS NOTES
Pharmacy Consultation Note  (Antibiotic Dosing and Monitoring)    Initial consult date: 3/24/23  Consulting physician/provider: Chana Martin MD  Drug: Vancomycin  Indication: Pneumonia (CAP); 7 days    Age/  Gender Height Weight IBW  Allergy Information   23 y.o./male 5' 11\" (180.3 cm) 165 lb (74.8 kg)     Ideal body weight: 75.3 kg (166 lb 0.1 oz)   Vancomycin      Renal Function:  Recent Labs     03/22/23  0443 03/23/23  0754 03/24/23  1011   BUN 11 10 9   CREATININE 0.8 0.8 0.8       Intake/Output Summary (Last 24 hours) at 3/24/2023 1618  Last data filed at 3/24/2023 1553  Gross per 24 hour   Intake 1000 ml   Output 1145 ml   Net -145 ml       Vancomycin Monitoring:  Trough:  No results for input(s): VANCOTROUGH in the last 72 hours. Random:  No results for input(s): VANCORANDOM in the last 72 hours. No results for input(s): Brayden Puff in the last 72 hours. Historical Cultures:  No results found for: ORG  No results for input(s): BC in the last 72 hours. Vancomycin Administration Times:  Recent vancomycin administrations                     vancomycin (VANCOCIN) 1,750 mg in sodium chloride 0.9 % 500 mL IVPB (mg) 1,750 mg New Bag 03/23/23 1850               Assessment:  Patient is a 21 y.o. male who has been initiated on vancomycin  Estimated Creatinine Clearance: 152 mL/min (based on SCr of 0.8 mg/dL). To dose vancomycin, pharmacy will be utilizing Trust[employer] calculation software for goal AUC/HENRY 400-600 mg/L-hr    Plan:   Will initiate vancomycin 1500 mg IV every 12 hours - to be run slowly over 3.5 hours per Dr. Alexis Julien  Will check vancomycin levels when appropriate  Will continue to monitor renal function   Pharmacy to follow      RHODA Florez [facility] 3/24/2023 4:18 PM

## 2023-03-24 NOTE — PROGRESS NOTES
Rachel Sean at bedside and aware of atrium chest chamber at 600cc kenneth, 400cc drainage since patient has been in pacu, no new orders.

## 2023-03-24 NOTE — OP NOTE
transferred to the PACU in stable condition.     Electronically signed by Geetha Lowery DO on 3/24/2023 at 9:47 AM

## 2023-03-24 NOTE — PROGRESS NOTES
notified via [facility] regarding reaction to vancomycin. Patient called RN to room stating medication was making him \"feel weird\" Upon entry to room, patients face/neck are visibly flushed, rash and pruritis on back of neck and patient verbalizes feeling generalized increase in temperature. Vitals are stable. IV Vancomycin stopped at this time. See new orders. Pharmacy notified at this time.

## 2023-03-24 NOTE — PROGRESS NOTES
CTS on unit and made aware of concerns regarding Infection (flag) for Pulmonary Tuberculosis. Per Osiel Sorensen NP that there were no concerns noted at this time and no special precautions needed.

## 2023-03-24 NOTE — CARE COORDINATION
Chart reviewed. OR today s/p VATS. Per Thoracic surgery note today, stable s/p  L VATS, and drainage of pleural effusion and chest wall biopsy. CT drainage adequate for immediate post operative period--currently at 600mL. Met with patient , girlfriend and mom in his room to discuss transition of care at discharge. Babak Moon choices given  for IV antibiotics at discharge. Patient is independent with ADL's, and lives in a one story home with his girlfriend, with bed and bath on first floor. List given, and patient checking with sister who is a nurse to decide. This CM updated that I need a choice for referral as soon as possible. Will check back with family before end of day. The discharge plan is to go home with girlfriend Canelo Thomas and  Babak Moon for antibiotics. Electronically signed by Omega Lyman RN on 3/24/2023 at 3:03 -5500    Addendum:1600- Referral called to [facility] For antibiotics. \The Plan for Transition of Care is related to the following treatment goals antibiotic therapy    The Patient and/or patient  was provided with a choice of provider and agrees   with the discharge plan. [x] Yes [] No    Freedom of choice list was provided with basic dialogue that supports the patient's individualized plan of care/goals, treatment preferences and shares the quality data associated with the providers.  [x] Yes [] No

## 2023-03-24 NOTE — PROGRESS NOTES
Vancomycin has been discontinued   Clinical Pharmacy to sign-off  Physician to re-consult pharmacy if future dosing is needed    Thank you for the consult,    Lani Flowers, PharmD, [facility], BCPS 3/23/2023 8:44 PM

## 2023-03-24 NOTE — PROGRESS NOTES
Called regarding patient having itching, rash on back of neck, full body warmth, and face flushing during infusion of vancomycin. Nursing stopped the vancomycin once this was recognized. Vital signs were noted as okay by nursing. Based on symptoms this is likely a moderate vancomycin infusion reaction. I recommended diphenhydramine 50 mg IV x1 and famotidine 25 mg IV x1. Monitor for hypotension and give fluids as needed. We will have nursing alert pharmacy and hold further dosing of vancomycin until reevaluation by rounding team in morning.

## 2023-03-24 NOTE — PROGRESS NOTES
INTRAOPERATIVE CONSULTATION (with FROZEN SECTION)   Chest mass - dense fibrous tissue and hemosiderin

## 2023-03-24 NOTE — PROGRESS NOTES
CHG bath preformed on the entirety of upper body. Changed linens and pt gown. Oral care preformed. Leads and wires changed to heart monitor to prepare for surgery. Ahmet Scales RN

## 2023-03-24 NOTE — PROGRESS NOTES
Patient has a total of 690cc sanguinous to serosanguinous drainage in chest tube since 2pm, when patient arrived back to the room  from surgery. Total out since surgery is 1810cc. CTS NP Carlos Tejeda updated.

## 2023-03-24 NOTE — ANESTHESIA POSTPROCEDURE EVALUATION
Department of Anesthesiology  Postprocedure Note    Patient: Ashish Ernst  MRN: [id number]  YOB: 2000  Date of evaluation: 3/24/2023      Procedure Summary     Date: 03/24/23 Room / Location: [facility] OR 01 / [facility]    Anesthesia Start: 8382 Anesthesia Stop: 0858    Procedure: [address] (Left) Diagnosis:       Loculated pleural effusion      (Loculated pleural effusion [J90])    Surgeons: Joie Stone DO Responsible Provider: Doc Thacker DO    Anesthesia Type: general ASA Status: 3          Anesthesia Type: No value filed.     Jonas Phase I: Jonas Score: 9    Jonas Phase II:        Anesthesia Post Evaluation    Patient location during evaluation: PACU  Patient participation: complete - patient participated  Level of consciousness: awake and alert  Airway patency: patent  Nausea & Vomiting: no nausea and no vomiting  Complications: no  Cardiovascular status: blood pressure returned to baseline  Respiratory status: acceptable  Hydration status: euvolemic  Multimodal analgesia pain management approach

## 2023-03-24 NOTE — DISCHARGE INSTRUCTIONS
YOUR INCISIONS DAILY WITH A CLEAN WASHCLOTH AND ANTIBACTERIAL SOAP. Do not wash your incisions after you have cleansed other parts of your body. You may shower but keep your back to the spray. Avoid hot water, comfortably warm is OK. ? If you went home with a dressing on any incision: Remove the dressing daily     and wash the incision with antibacterial soap and water and pat dry. Only     cover the incision with a dressing if it is draining. Otherwise leave it     uncovered (unless your doctor told you otherwise)  ? No tub baths until your incisions are healed. ? DO NOT APPLY ANYTHING OTHER THAN ANTIBACTERIAL SOAP AND     WATER TO YOUR INCISIONS. Do not apply lotions, creams, ointments,     hydrogen peroxide or powder. ? Keep your incisions CLEAN. Think CLEAN. No one should touch your     incisions without washing their hands first.  Do not let pets touch your incisions     (have incisions covered with clothing when around pets). ? Wear loose clothing. For support women should wear a bra. PAUL Hose (white stockings): Should be worn during the day and off at night. Someone other than the patient will need to put on and take off the hose. It is too much pulling and tugging for the patient. Expect them to be difficult to put on and they should feel snug. These are usually worn for 2-4 weeks. Wash them by hand to keep their elasticity. Diet:  Eat a low fat, low salt diet. Eat a high fiber diet to avoid constipation and straining during bowel movements (whole grains, raw veggies, fruits)    Diabetics:  Check blood sugars twice a day. Contact your primary care physician if your blood sugar is consistently above 130. Good tissue healing occurs when blood sugars are less than 130. Sexual Activity: When you can climb 2 flights of stairs without chest pain, shortness of breath or fatigue, it is generally OK to resume sexual activity. Depression:   It is not unusual to have feelings of wheezing, dyspnea, itchy throat):  Administer Benadryl 50 mg IV push x 1. Notify office or physician in a timely manner. Severe reaction i.e. Anaphylaxis (wheezing or stidor, sudden rash, lightheadedness, hypotension):  Administer epinephrine subcutaneous 0.3mg (1:1000) x 1 dose. May repeat twice every 5 minutes if needed. Call EMS and notify office or physician immediately. For all above reactions: administer Solu-Cortef 100mg slow IV push x 1. VASCULAR ACCESS DRESSING CHANGE PROTOCOL  Cleanse insertion site with Shur-Clens® or equivalent three times. Secure catheter with Steri-Strips®, Bone®, or equivalent securing device. Apply Opsite® 3000 or equivalent transparent dressing. Change dressing twice weekly, maintaining sterile technique. If there is a BioPatch®, SilverSite® or equivalent, change once weekly only or as needed. FOLLOW-UP VISITS  Nursing staff should call the office during business hours to schedule a follow-up appointment once the patient is admitted to the service or facility. Every effort should be made to have patient follow-up within 2 weeks of discharge. Continue IV antibiotic therapy until patient is seen in the office or unless specific stop date is noted on the original order or unless otherwise ordered by physician. Call office to ensure stop date is correct before stopping antibiotics. Travis Laurent. Jolayne Jobs, MD Mindy Penton, MD Reeta Rua, MD Ulice Soda, MD Munir P. Lawerence Burkitt, MD    Two week appointment to see ID           Future Appointments   Date Time Provider Rafi Matthews   3/31/2023  2:00 PM Birdie Ballesteros, SERGIO - CNP [facility]   4/11/2023  1:45 PM Aubrey Bauer DO CARDIO SURG [facility]

## 2023-03-24 NOTE — PROGRESS NOTES
CTS PACU Progress Note:      Brief HPI: Awake, alert. No complaints. Denies CP, palpitations, SOB at rest, dizziness/lightheadedness. Vitals:    03/24/23 0957 03/24/23 1000 03/24/23 1015 03/24/23 1021   BP: 105/65 105/65 (!) 128/93 (!) 128/93   Pulse: (!) 102 100 (!) 110 (!) 107   Resp: 16 18 24 22   Temp: 97.1 °F (36.2 °C)      TempSrc: Temporal      SpO2: 98%      Weight:       Height:               Intake/Output Summary (Last 24 hours) at 3/24/2023 1032  Last data filed at 3/24/2023 0944  Gross per 24 hour   Intake 1000 ml   Output --   Net 1000 ml       CURRENT CT output:   Pleural: 600mL     Recent Labs     03/22/23  0443 03/23/23  0754   WBC 6.5 7.0   HGB 13.2 13.3   HCT 40.7 40.3    241     Recent Labs     03/22/23  0443 03/23/23  0754   BUN 11 10   CREATININE 0.8 0.8           PE  Cardiac: RRR  Lungs: decreased bases  Chest incision with DSD C/D/I. Chest tubes x 1 present and secure. Abd: Soft, nontender, +BS  Ext: TABARES        A/P: Day of Surgery     Stable s/p  L VATS, drainage of pleural effusion, chest wall biopsy  Immediate post-operative hgb stable at 13.8  Maintaining NSR  VSS  CXR reviewed  CT drainage adequate for immediate post operative period--currently at 600mL.    Patient with PCA ordered         Dispo: continue transfer to PCCU      This patient's case and care plan was discussed with the attending surgeon

## 2023-03-24 NOTE — ANESTHESIA PRE PROCEDURE
COVID19 Not Detected 03/23/2023 11:50 AM       03/24/23 CT  Impression   Cardiac size nonenlarged with pericardial effusion most notable along the   right atrium margin up to 3 cm thickness. Along the left ventricular free   wall there is pericardial effusion fluid signal which becomes coalescent with   pericardial fat pad edema and a left pleural fluid collection. Considerable   volume loss of a largely atelectatic and opacified left hemithorax with   heterogeneous signal of a left moderate pleural fluid collection could   represent empyema or chronic effusion as considerations with atelectatic   changes of the remaining left upper lung minimally aerated.  Further cardiac   imaging may be performed to evaluate for cardiac motion restriction such as   echo         Anesthesia Evaluation  Patient summary reviewed and Nursing notes reviewed  Airway: Mallampati: II  TM distance: >3 FB   Neck ROM: full  Mouth opening: > = 3 FB   Dental:      Comment: Pt denies any loose, chipped, or missing teeth    Pulmonary: breath sounds clear to auscultation  (+) current smoker (Vaping)          Patient did not smoke on day of surgery. ROS comment: Left sided pleural effusion   Cardiovascular:  Exercise tolerance: good (>4 METS),           Rhythm: regular  Rate: normal  Echocardiogram reviewed         Beta Blocker:  Not on Beta Blocker      ROS comment: Pericardial Effusion     Neuro/Psych:   Negative Neuro/Psych ROS              GI/Hepatic/Renal:            ROS comment: Acute Appendicitis . Endo/Other: Negative Endo/Other ROS                    Abdominal:             Vascular: negative vascular ROS. Other Findings:           Anesthesia Plan      general     ASA 3     (PIV 20g L FA and 22g R FA)  Induction: intravenous. arterial line  MIPS: Postoperative opioids intended, Prophylactic antiemetics administered and Postoperative trial extubation. Anesthetic plan and risks discussed with patient.     Use of

## 2023-03-24 NOTE — PROGRESS NOTES
[address] [place] [address] [facility]  [facility]  Progress note     HISTORY OF PRESENT ILLNESS:   70-year-old male with no past medical history presented with right lower quadrant abdominal pain. CT abdomen showed possible acute appendicitis. CT chest shows large left-sided loculated pleural effusion with possible pneumonia and empyema. CT surgery already evaluated for possible VATS in a.m. ID consulted for left-sided pleural effusion/empyema. Past Medical History:    No past medical history on file. Past Surgical History:    No past surgical history on file.   Current Medications:   Scheduled Meds:   acetaminophen  650 mg Oral Q6H    ipratropium-albuterol  1 ampule Inhalation Q4H WA    mupirocin   Topical BID    vancomycin  1,500 mg IntraVENous Q12H    lactobacillus  1 capsule Oral TID    polyethylene glycol  17 g Oral BID    sennosides-docusate sodium  2 tablet Oral QPM    colchicine  0.6 mg Oral Daily    cefTRIAXone (ROCEPHIN) IV  2,000 mg IntraVENous Q24H    metroNIDAZOLE  500 mg IntraVENous Q8H    sodium chloride flush  10 mL IntraVENous 2 times per day     Continuous Infusions:   HYDROmorphone      sodium chloride      sodium chloride       PRN Meds:[START ON 3/25/2023] melatonin, bisacodyl, Menthol, sodium chloride, trimethobenzamide, naloxone, polyethylene glycol, diphenhydrAMINE, artificial tears, sodium chloride, oxyCODONE **OR** [DISCONTINUED] oxyCODONE, sodium chloride flush, sodium chloride, ondansetron    Allergies:  Vancomycin    Social History:   Social History     Socioeconomic History    Marital status: Single     Spouse name: None    Number of children: None    Years of education: None    Highest education level: None   Tobacco Use    Smoking status: Never   Vaping Use    Vaping Use: Every day    Substances: Nicotine, Flavoring    Devices: Disposable, Pre-filled or refillable cartridge   Substance and Sexual Activity    Alcohol use: Defer    Drug use: Defer     Tobacco: No  Alcohol: No  Pets: No  Travel: No    Family History:   No family history on file. . Otherwise non-pertinent to the chief complaint. REVIEW OF SYSTEMS:    CONSTITUTIONAL:  No chills, fevers or night sweats. No loss of weight. EYES:  No double vision or drainage from eyes, ears or throat. HEENT:  No neck stiffness. No dysphagia. No drainage from eyes, ears or throat  RESPIRATORY:  No cough, productive sputum or hemoptysis. CARDIOVASCULAR:  No chest pain, palpitations, orthopnea or dyspnea on exertion. GASTROINTESTINAL:  No nausea, vomiting, diarrhea or constipation or hematochezia   GENITOURINARY:  No frequency burning dysuria or hematuria. INTEGUMENT/BREAST:  No rash or breast masses. HEMATOLOGIC/LYMPHATIC:  No lymphadenopathy or blood dyscrasics. ALLERGIC/IMMUNOLOGIC:  No anaphylaxis. ENDOCRINE:  No polyuria or polydipsia or temperature intolerance. MUSCULOSKELETAL:  No myalgia or arthralgia. Full ROM. NEUROLOGICAL:  No focal motor sensory deficit. BEHAVIOR/PSYCH:  No psychosis. PHYSICAL EXAM:    Vitals:    /78   Pulse 82   Temp 97.3 °F (36.3 °C) (Oral)   Resp 21   Ht 5' 11\" (1.803 m)   Wt 165 lb (74.8 kg)   SpO2 97%   BMI 23.01 kg/m²   Constitutional: The patient is awake, alert, and oriented. Skin: Warm and dry. No rashes were noted. No jaundice. HEENT: Eyes show round, and reactive pupils. Moist mucous membranes, no ulcerations, no thrush. Neck: Supple to movements. No lymphadenopathy. Chest: No use of accessory muscles to breathe. Symmetrical expansion. Auscultation reveals no wheezing, crackles, or rhonchi. Cardiovascular: S1 and S2 are rhythmic and regular. No murmurs appreciated. Abdomen: Positive bowel sounds to auscultation. Pain in the right lower quadrant. . No masses felt. No hepatosplenomegaly. Genitourinary: Deferred  Extremities: No clubbing, no cyanosis, no edema.   Musculoskeletal: Pain in range of motion of any joints  Neurological: Following commands, no focal

## 2023-03-24 NOTE — PROGRESS NOTES
output from chest tube since OR but h/h postop OK    Objective:     Physical Exam:   /80   Pulse 71   Temp 97 °F (36.1 °C)   Resp 22   Ht 5' 11\" (1.803 m)   Wt 165 lb (74.8 kg)   SpO2 98%   BMI 23.01 kg/m²     General appearance:in no distress   Lungs: diminished on L base, no wheezing or crackles. CT in place with bloody output  Heart: Regular rate and rhythm, S1, S2 normal   Abdomen: Soft, non-tender and not-distended.  Bowel sounds normal.   Extremities: no edema   Neurologic: Grossly normal and non focal, CN II-XII intact       Alex Perkins MD   Hospitalist Service   03/24/23 1:56 PM

## 2023-03-24 NOTE — BRIEF OP NOTE
Brief Postoperative Note    Darrel Encinas III  YOB: 2000  [id number]    Pre-operative Diagnosis: loculated pleural effusion    Post-operative Diagnosis: Same, chest wall mass    Operation: L VATS, drainage of pleural effusion, chest wall biopsy    Anesthesia: General    Surgeon: Clarissa Montes    Assistants: Dionne Hyman    Estimated Blood Loss: 600     Complications: none apparent    Implants: none

## 2023-03-24 NOTE — ANESTHESIA PROCEDURE NOTES
Arterial Line:    An arterial line was placed using surface landmarks, in the OR for the following indication(s): continuous blood pressure monitoring and blood sampling needed. A 20 gauge (size), 1 and 3/8 inch (length), Arrow (type) catheter was placed, Seldinger technique not used, into the right radial artery, secured by tape. Anesthesia type: Local  Local infiltration: Injection    Events:  patient tolerated procedure well with no complications.   Anesthesiologist: Doc Thacker DO  Performed: Anesthesiologist   Preanesthetic Checklist  Completed: patient identified, IV checked, site marked, risks and benefits discussed, surgical/procedural consents, equipment checked, pre-op evaluation, timeout performed, anesthesia consent given, oxygen available and monitors applied/VS acknowledged

## 2023-03-25 ENCOUNTER — APPOINTMENT (OUTPATIENT)
Dept: GENERAL RADIOLOGY | Age: 23
DRG: 121 | End: 2023-03-25
Payer: MEDICAID

## 2023-03-25 LAB
ANION GAP SERPL CALCULATED.3IONS-SCNC: 10 MMOL/L (ref 7–16)
BASOPHILS # BLD: 0.02 E9/L (ref 0–0.2)
BASOPHILS NFR BLD: 0.2 % (ref 0–2)
BUN SERPL-MCNC: 6 MG/DL (ref 6–20)
CALCIUM SERPL-MCNC: 8.2 MG/DL (ref 8.6–10.2)
CHLORIDE SERPL-SCNC: 102 MMOL/L (ref 98–107)
CO2 SERPL-SCNC: 21 MMOL/L (ref 22–29)
CREAT SERPL-MCNC: 0.7 MG/DL (ref 0.7–1.2)
EBV VCA IGM SER IA-ACNC: <10 U/ML (ref 0–43.9)
EOSINOPHIL # BLD: 0.03 E9/L (ref 0.05–0.5)
EOSINOPHIL NFR BLD: 0.3 % (ref 0–6)
ERYTHROCYTE [DISTWIDTH] IN BLOOD BY AUTOMATED COUNT: 13.6 FL (ref 11.5–15)
GLUCOSE SERPL-MCNC: 87 MG/DL (ref 74–99)
GRAM STAIN ORDERABLE: NORMAL
HCT VFR BLD AUTO: 44.1 % (ref 37–54)
HCT VFR BLD AUTO: 45 % (ref 37–54)
HGB BLD-MCNC: 14.2 G/DL (ref 12.5–16.5)
HGB BLD-MCNC: 14.5 G/DL (ref 12.5–16.5)
IMM GRANULOCYTES # BLD: 0.07 E9/L
IMM GRANULOCYTES NFR BLD: 0.7 % (ref 0–5)
LYMPHOCYTES # BLD: 0.76 E9/L (ref 1.5–4)
LYMPHOCYTES NFR BLD: 7.1 % (ref 20–42)
MCH RBC QN AUTO: 28.6 PG (ref 26–35)
MCHC RBC AUTO-ENTMCNC: 32.2 % (ref 32–34.5)
MCV RBC AUTO: 88.7 FL (ref 80–99.9)
MONOCYTES # BLD: 0.92 E9/L (ref 0.1–0.95)
MONOCYTES NFR BLD: 8.6 % (ref 2–12)
NEUTROPHILS # BLD: 8.88 E9/L (ref 1.8–7.3)
NEUTS SEG NFR BLD: 83.1 % (ref 43–80)
PLATELET # BLD AUTO: 297 E9/L (ref 130–450)
PMV BLD AUTO: 9.4 FL (ref 7–12)
POTASSIUM SERPL-SCNC: 3.9 MMOL/L (ref 3.5–5)
RBC # BLD AUTO: 4.97 E12/L (ref 3.8–5.8)
RHEUMATOID FACT SER NEPH-ACNC: <10 IU/ML (ref 0–13)
SODIUM SERPL-SCNC: 133 MMOL/L (ref 132–146)
T PALLIDUM AB SER QL AGGL: NON REACTIVE
WBC # BLD: 10.7 E9/L (ref 4.5–11.5)

## 2023-03-25 PROCEDURE — 99231 SBSQ HOSP IP/OBS SF/LOW 25: CPT | Performed by: INTERNAL MEDICINE

## 2023-03-25 PROCEDURE — 6360000002 HC RX W HCPCS: Performed by: NURSE PRACTITIONER

## 2023-03-25 PROCEDURE — 6370000000 HC RX 637 (ALT 250 FOR IP): Performed by: NURSE PRACTITIONER

## 2023-03-25 PROCEDURE — 2580000003 HC RX 258: Performed by: NURSE PRACTITIONER

## 2023-03-25 PROCEDURE — 80048 BASIC METABOLIC PNL TOTAL CA: CPT

## 2023-03-25 PROCEDURE — 36415 COLL VENOUS BLD VENIPUNCTURE: CPT

## 2023-03-25 PROCEDURE — 71045 X-RAY EXAM CHEST 1 VIEW: CPT

## 2023-03-25 PROCEDURE — 94640 AIRWAY INHALATION TREATMENT: CPT

## 2023-03-25 PROCEDURE — 6360000002 HC RX W HCPCS: Performed by: INTERNAL MEDICINE

## 2023-03-25 PROCEDURE — 2580000003 HC RX 258: Performed by: INTERNAL MEDICINE

## 2023-03-25 PROCEDURE — 2500000003 HC RX 250 WO HCPCS: Performed by: NURSE PRACTITIONER

## 2023-03-25 PROCEDURE — 85014 HEMATOCRIT: CPT

## 2023-03-25 PROCEDURE — 85018 HEMOGLOBIN: CPT

## 2023-03-25 PROCEDURE — 2060000000 HC ICU INTERMEDIATE R&B

## 2023-03-25 PROCEDURE — 85025 COMPLETE CBC W/AUTO DIFF WBC: CPT

## 2023-03-25 RX ORDER — ENOXAPARIN SODIUM 100 MG/ML
40 INJECTION SUBCUTANEOUS DAILY
Status: DISCONTINUED | OUTPATIENT
Start: 2023-03-25 | End: 2023-03-31 | Stop reason: HOSPADM

## 2023-03-25 RX ADMIN — DIPHENHYDRAMINE HYDROCHLORIDE 25 MG: 50 INJECTION, SOLUTION INTRAMUSCULAR; INTRAVENOUS at 18:44

## 2023-03-25 RX ADMIN — OXYCODONE HYDROCHLORIDE 5 MG: 5 TABLET ORAL at 20:40

## 2023-03-25 RX ADMIN — IPRATROPIUM BROMIDE AND ALBUTEROL SULFATE 1 AMPULE: .5; 2.5 SOLUTION RESPIRATORY (INHALATION) at 21:08

## 2023-03-25 RX ADMIN — METRONIDAZOLE 500 MG: 500 INJECTION, SOLUTION INTRAVENOUS at 17:38

## 2023-03-25 RX ADMIN — Medication 1 CAPSULE: at 20:31

## 2023-03-25 RX ADMIN — CEFTRIAXONE SODIUM 2000 MG: 2 INJECTION, POWDER, FOR SOLUTION INTRAMUSCULAR; INTRAVENOUS at 17:20

## 2023-03-25 RX ADMIN — MUPIROCIN: 20 OINTMENT TOPICAL at 20:44

## 2023-03-25 RX ADMIN — IPRATROPIUM BROMIDE AND ALBUTEROL SULFATE 1 AMPULE: .5; 2.5 SOLUTION RESPIRATORY (INHALATION) at 12:38

## 2023-03-25 RX ADMIN — COLCHICINE 0.6 MG: 0.6 TABLET ORAL at 08:48

## 2023-03-25 RX ADMIN — VANCOMYCIN HYDROCHLORIDE 1500 MG: 10 INJECTION, POWDER, LYOPHILIZED, FOR SOLUTION INTRAVENOUS at 05:26

## 2023-03-25 RX ADMIN — DIPHENHYDRAMINE HYDROCHLORIDE 25 MG: 50 INJECTION, SOLUTION INTRAMUSCULAR; INTRAVENOUS at 05:15

## 2023-03-25 RX ADMIN — ACETAMINOPHEN 650 MG: 325 TABLET ORAL at 14:49

## 2023-03-25 RX ADMIN — METRONIDAZOLE 500 MG: 500 INJECTION, SOLUTION INTRAVENOUS at 09:49

## 2023-03-25 RX ADMIN — SENNOSIDES AND DOCUSATE SODIUM 2 TABLET: 8.6; 5 TABLET ORAL at 17:19

## 2023-03-25 RX ADMIN — METRONIDAZOLE 500 MG: 500 INJECTION, SOLUTION INTRAVENOUS at 00:25

## 2023-03-25 RX ADMIN — BISACODYL 5 MG: 5 TABLET, COATED ORAL at 14:50

## 2023-03-25 RX ADMIN — MUPIROCIN: 20 OINTMENT TOPICAL at 08:46

## 2023-03-25 RX ADMIN — ACETAMINOPHEN 650 MG: 325 TABLET ORAL at 08:47

## 2023-03-25 RX ADMIN — IPRATROPIUM BROMIDE AND ALBUTEROL SULFATE 1 AMPULE: .5; 2.5 SOLUTION RESPIRATORY (INHALATION) at 08:30

## 2023-03-25 RX ADMIN — Medication 1 CAPSULE: at 14:49

## 2023-03-25 RX ADMIN — POLYETHYLENE GLYCOL 3350 17 G: 17 POWDER, FOR SOLUTION ORAL at 14:50

## 2023-03-25 RX ADMIN — ENOXAPARIN SODIUM 40 MG: 100 INJECTION SUBCUTANEOUS at 12:58

## 2023-03-25 RX ADMIN — ACETAMINOPHEN 650 MG: 325 TABLET ORAL at 20:31

## 2023-03-25 RX ADMIN — VANCOMYCIN HYDROCHLORIDE 1500 MG: 10 INJECTION, POWDER, LYOPHILIZED, FOR SOLUTION INTRAVENOUS at 19:14

## 2023-03-25 RX ADMIN — Medication 1 CAPSULE: at 08:48

## 2023-03-25 ASSESSMENT — PAIN SCALES - GENERAL
PAINLEVEL_OUTOF10: 0
PAINLEVEL_OUTOF10: 4
PAINLEVEL_OUTOF10: 8
PAINLEVEL_OUTOF10: 6
PAINLEVEL_OUTOF10: 0

## 2023-03-25 ASSESSMENT — PAIN DESCRIPTION - LOCATION
LOCATION: CHEST
LOCATION: INCISION
LOCATION: CHEST

## 2023-03-25 ASSESSMENT — PAIN - FUNCTIONAL ASSESSMENT: PAIN_FUNCTIONAL_ASSESSMENT: PREVENTS OR INTERFERES SOME ACTIVE ACTIVITIES AND ADLS

## 2023-03-25 ASSESSMENT — PAIN DESCRIPTION - ORIENTATION
ORIENTATION: LEFT
ORIENTATION: LEFT
ORIENTATION: LEFT;POSTERIOR

## 2023-03-25 ASSESSMENT — PAIN DESCRIPTION - DESCRIPTORS
DESCRIPTORS: DISCOMFORT;NAGGING;ACHING
DESCRIPTORS: ACHING;DISCOMFORT;DULL
DESCRIPTORS: ACHING

## 2023-03-25 NOTE — PROGRESS NOTES
Pharmacy Consultation Note  (Antibiotic Dosing and Monitoring)    Initial consult date: 3/24/23  Consulting physician/provider: Chana Martin MD  Drug: Vancomycin  Indication: Pneumonia (CAP); 7 days    Age/  Gender Height Weight IBW  Allergy Information   23 y.o./male 5' 11\" (180.3 cm) 165 lb (74.8 kg)     Ideal body weight: 75.3 kg (166 lb 0.1 oz)   Vancomycin      Renal Function:  Recent Labs     03/23/23  0754 03/24/23  1011 03/25/23  0538   BUN 10 9 6   CREATININE 0.8 0.8 0.7         Intake/Output Summary (Last 24 hours) at 3/25/2023 0825  Last data filed at 3/25/2023 0704  Gross per 24 hour   Intake 1000 ml   Output 3495 ml   Net -2495 ml         Vancomycin Monitoring:  Trough:  No results for input(s): VANCOTROUGH in the last 72 hours. Random:  No results for input(s): VANCORANDOM in the last 72 hours. No results for input(s): Brayden Puff in the last 72 hours. Historical Cultures:  No results found for: ORG  No results for input(s): BC in the last 72 hours. Vancomycin Administration Times:  Recent vancomycin administrations                     vancomycin 1500 mg in sodium chloride 0.9% 300 mL IVPB (mg) 1,500 mg New Bag 03/25/23 0526     1,500 mg New Bag 03/24/23 1800    vancomycin (VANCOCIN) 1,750 mg in sodium chloride 0.9 % 500 mL IVPB (mg) 1,750 mg New Bag 03/23/23 1850                  Assessment:  Patient is a 21 y.o. male who has been initiated on vancomycin  Estimated Creatinine Clearance: 174 mL/min (based on SCr of 0.7 mg/dL). To dose vancomycin, pharmacy will be utilizing [employer] calculation software for goal AUC/HENRY 400-600 mg/L-hr    Plan:   Will continue vancomycin 1500 mg IV every 12 hours - to be run slowly over 3.5 hours per Dr. Alexis Julien  Will check vancomycin levels when appropriate  Will continue to monitor renal function   Pharmacy to follow    Donovan Ganser, PharmD, [facility]  3/25/2023  8:29 AM

## 2023-03-25 NOTE — PROGRESS NOTES
POD#1 Awake, alert. No complaints. Reports pain tolerable with PCA. Denies CP, palpitations, SOB at rest, dizziness/lightheadedness. Vitals:    03/25/23 0340 03/25/23 0728 03/25/23 1114 03/25/23 1144   BP: 117/68 105/66 (!) 95/55 100/60   Pulse: 64 75 71 77   Resp: 15 15 12 15   Temp: 97.2 °F (36.2 °C) 97.9 °F (36.6 °C) 97.5 °F (36.4 °C) 98.1 °F (36.7 °C)   TempSrc: Temporal Oral Oral Temporal   SpO2: 98% 100% 99% 100%   Weight:       Height:         O2: 3L/NC with SpO2 100%--will have nursing wean oxygen--he does not need O2 flowing through the NC for PCA monitoring       Intake/Output Summary (Last 24 hours) at 3/25/2023 1420  Last data filed at 3/25/2023 1405  Gross per 24 hour   Intake 60 ml   Output 3750 ml   Net -3690 ml         UO: 450mL/8hr   CT output:    Pleural: 750mL/8hr (2630mL/24hrs)      Recent Labs     03/23/23  0754 03/24/23  1011 03/24/23  1155 03/24/23  1958 03/25/23  0538   WBC 7.0 12.2*  --   --  10.7   HGB 13.3 13.8 13.9 14.3 14.2   HCT 40.3 42.9 43.0 43.7 44.1    299  --   --  297      Recent Labs     03/23/23  0754 03/24/23  1011 03/25/23  0538   BUN 10 9 6   CREATININE 0.8 0.8 0.7         Telemetry: SR      PE  Cardiac: RRR  Lungs: decreased bases  Chest incision with DSD C/D/I. Chest tube x 1 present and secure. Abd: Soft, nontender, +BS  Ext: TABARES          POD#1    A/P:     1) Loculated pleural effusion  --Stable s/p L VATS, drainage of pleural effusion, chest wall biopsy on 3/24/23  --follow biopsy results  --VSS  --wean oxygen to keep SpO2 greater than or equal to 92  --chest tube without continued output and no airleak.  Continue chest tubes until improvement in drainage amount  --Increase activity as tolerated  --Encourage incentive spirometry   --lovenox for dvt prophylaxis      2) Expected acute post operative anemia secondary to surgery  --stable--hgb has remained stable, will decrease frequency of draws to q12h      3) Post operative pain intolerance  --remains tolerable

## 2023-03-25 NOTE — PROGRESS NOTES
[address] [place] [address] [facility]  [facility]  Progress note     HISTORY OF PRESENT ILLNESS:   80-year-old male with no past medical history presented with right lower quadrant abdominal pain. CT abdomen showed possible acute appendicitis. CT chest shows large left-sided loculated pleural effusion with possible pneumonia and empyema. CT surgery already evaluated for possible VATS in a.m. ID consulted for left-sided pleural effusion/empyema. Past Medical History:    No past medical history on file. Past Surgical History:    No past surgical history on file.   Current Medications:   Scheduled Meds:   enoxaparin  40 mg SubCUTAneous Daily    acetaminophen  650 mg Oral Q6H    ipratropium-albuterol  1 ampule Inhalation Q4H WA    mupirocin   Topical BID    vancomycin  1,500 mg IntraVENous Q12H    lactobacillus  1 capsule Oral TID    polyethylene glycol  17 g Oral BID    sennosides-docusate sodium  2 tablet Oral QPM    colchicine  0.6 mg Oral Daily    cefTRIAXone (ROCEPHIN) IV  2,000 mg IntraVENous Q24H    metroNIDAZOLE  500 mg IntraVENous Q8H    sodium chloride flush  10 mL IntraVENous 2 times per day     Continuous Infusions:   HYDROmorphone      sodium chloride      sodium chloride       PRN Meds:melatonin, bisacodyl, Menthol, sodium chloride, trimethobenzamide, naloxone, polyethylene glycol, diphenhydrAMINE, artificial tears, sodium chloride, oxyCODONE **OR** [DISCONTINUED] oxyCODONE, sodium chloride flush, sodium chloride, ondansetron    Allergies:  Vancomycin    Social History:   Social History     Socioeconomic History    Marital status: Single     Spouse name: None    Number of children: None    Years of education: None    Highest education level: None   Tobacco Use    Smoking status: Never   Vaping Use    Vaping Use: Every day    Substances: Nicotine, Flavoring    Devices: Disposable, Pre-filled or refillable cartridge   Substance and Sexual Activity    Alcohol use: Defer    Drug use: Defer further evaluation. 4. Volume loss associated with visualized left lower lung with subpleural   effusion of increased attenuation. Clinical correlation recommended. 5. Atelectatic changes are also present in visualized left lung base. 6. Moderate pericardial effusion. XR CHEST PORTABLE    (Results Pending)       Microbiology:  Pending  No results for input(s): BC in the last 72 hours. No results for input(s): ORG in the last 72 hours. No results for input(s): Serge Staple in the last 72 hours. No results for input(s): STREPNEUMAGU in the last 72 hours. No results for input(s): LP1UAG in the last 72 hours. No results for input(s): ASO in the last 72 hours. No results for input(s): CULTRESP in the last 72 hours. Assessment:  Large left-sided loculated pleural effusion  Community-acquired pneumonia  Acute appendicitis  Pericardial effusion without tamponade  Status post left-sided VATS with pleural biopsy and pleural fluid drainage    Plan:    Continue ceftriaxone and Flagy and vancomycin. HIV negative, Fungitell and serum galactomannan pending  Hepatitis screen pending  Left-sided chest tube has bloody drainage. Follow with CT surgery. ID will continue to follow  Surgical culture and pleural fluid cultures are no growth so far    Thank you for having us see this patient in consultation. I will be discussing this case with the treating physicians.       Electronically signed by Nasrin Cook MD on 3/25/2023 at 7:13 PM

## 2023-03-25 NOTE — PROGRESS NOTES
glycol, diphenhydrAMINE, artificial tears, sodium chloride, oxyCODONE **OR** [DISCONTINUED] oxyCODONE, sodium chloride flush, sodium chloride, ondansetron    I/O    Intake/Output Summary (Last 24 hours) at 3/25/2023 1114  Last data filed at 3/25/2023 0704  Gross per 24 hour   Intake --   Output 3495 ml   Net -3495 ml       Labs:   Recent Labs     03/23/23  0754 03/24/23  1011 03/24/23  1155 03/24/23  1958 03/25/23  0538   WBC 7.0 12.2*  --   --  10.7   HGB 13.3 13.8 13.9 14.3 14.2   HCT 40.3 42.9 43.0 43.7 44.1    299  --   --  297       Recent Labs     03/23/23  0754 03/24/23  1011 03/25/23  0538    141 133   K 4.0 4.4 3.9    110* 102   CO2 22 19* 21*   BUN 10 9 6   CREATININE 0.8 0.8 0.7   CALCIUM 8.8 8.2* 8.2*       Recent Labs     03/23/23  0754   PROT 7.0   ALKPHOS 121   ALT 8   AST 12   BILITOT 0.5       No results for input(s): INR in the last 72 hours. Recent Labs     03/23/23  0754   CKTOTAL 160       Chronic labs:  Lab Results   Component Value Date    INR 1.6 03/22/2023       Microbiology:  Pending  No results for input(s): BC in the last 72 hours. No results for input(s): ORG in the last 72 hours. No results for input(s): Robbie Paulo in the last 72 hours. No results for input(s): STREPNEUMAGU in the last 72 hours. No results for input(s): LP1UAG in the last 72 hours. No results for input(s): ASO in the last 72 hours. No results for input(s): CULTRESP in the last 72 hours. No results for input(s): PROCAL in the last 72 hours. Radiology:  CT CHEST W CONTRAST    Result Date: 3/22/2023  Cardiac size nonenlarged with pericardial effusion most notable along the right atrium margin up to 3 cm thickness. Along the left ventricular free wall there is pericardial effusion fluid signal which becomes coalescent with pericardial fat pad edema and a left pleural fluid collection.  Considerable volume loss of a largely atelectatic and opacified left hemithorax with heterogeneous signal of

## 2023-03-25 NOTE — PLAN OF CARE
Problem: Discharge Planning  Goal: Discharge to home or other facility with appropriate resources  3/25/2023 1323 by Zack Chapman RN  Outcome: Progressing     Problem: ABCDS Injury Assessment  Goal: Absence of physical injury  3/25/2023 1323 by Zack Chapman RN  Outcome: Progressing     Problem: Pain  Goal: Verbalizes/displays adequate comfort level or baseline comfort level  3/25/2023 1323 by Zack Chapman RN  Outcome: Progressing

## 2023-03-25 NOTE — PATIENT CARE CONFERENCE
P Quality Flow/Interdisciplinary Rounds Progress Note        Quality Flow Rounds held on March 25, 2023    Disciplines Attending:  Bedside Nurse, , , and Nursing Unit Leadership    Dorene Asai III was admitted on 3/21/2023  2:48 PM    Anticipated Discharge Date:  Expected Discharge Date: 03/23/23    Disposition:    Arash Score:  Arash Scale Score: 20    Readmission Risk              Risk of Unplanned Readmission:  8           Discussed patient goal for the day, patient clinical progression, and barriers to discharge.   The following Goal(s) of the Day/Commitment(s) have been identified:  ambulate/report labs       Zoya Babb RN  March 25, 2023

## 2023-03-25 NOTE — PROGRESS NOTES
PROGRESS NOTE     CARDIOLOGY    Chief complaint: Seen today for follow up, management & recommendations for pericardial effusion    He denies chest pain or shortness of breath today. He was reclining in bed appears comfortable and in no distress. Wt Readings from Last 3 Encounters:   03/21/23 165 lb (74.8 kg)     Temp Readings from Last 3 Encounters:   03/25/23 97.9 °F (36.6 °C) (Oral)     BP Readings from Last 3 Encounters:   03/25/23 (!) 103/53     Pulse Readings from Last 3 Encounters:   03/25/23 84         Intake/Output Summary (Last 24 hours) at 3/25/2023 1648  Last data filed at 3/25/2023 1453  Gross per 24 hour   Intake 60 ml   Output 3395 ml   Net -3335 ml       Recent Labs     03/23/23  0754 03/24/23  1011 03/24/23  1155 03/24/23  1958 03/25/23  0538   WBC 7.0 12.2*  --   --  10.7   HGB 13.3 13.8 13.9 14.3 14.2   HCT 40.3 42.9 43.0 43.7 44.1   MCV 87.8 89.9  --   --  88.7    299  --   --  297     Recent Labs     03/23/23  0754 03/24/23  1011 03/25/23  0538    141 133   K 4.0 4.4 3.9    110* 102   CO2 22 19* 21*   BUN 10 9 6   CREATININE 0.8 0.8 0.7   MG 2.0 1.8  --      No results for input(s): PROTIME, INR in the last 72 hours. Recent Labs     03/23/23  0754   CKTOTAL 160     No results for input(s): BNP in the last 72 hours. No results for input(s): CHOL, HDL, TRIG in the last 72 hours. Invalid input(s): CHOLHDLR, LDLCALCU  No results for input(s): TROPHS in the last 72 hours.       enoxaparin (LOVENOX) injection 40 mg, Daily  melatonin disintegrating tablet 5 mg, Nightly PRN  acetaminophen (TYLENOL) tablet 650 mg, Q6H  bisacodyl (DULCOLAX) EC tablet 5 mg, Daily PRN  ipratropium-albuterol (DUONEB) nebulizer solution 1 ampule, Q4H WA  Menthol LOZG 1 lozenge, Q2H PRN  mupirocin (BACTROBAN) 2 % ointment, BID  sodium chloride (OCEAN, BABY AYR) 0.65 % nasal spray 1 spray, PRN  trimethobenzamide (TIGAN) injection 200 mg, Q6H PRN  naloxone 0.4 mg in 10 mL sodium chloride syringe, PRN  polyethylene glycol (GLYCOLAX) packet 17 g, Daily PRN  HYDROmorphone (DILAUDID) 1 mg/mL PCA, Continuous  diphenhydrAMINE (BENADRYL) injection 25 mg, Q6H PRN  vancomycin 1500 mg in sodium chloride 0.9% 300 mL IVPB, Q12H  lubrifresh P.M. (artificial tears) ophthalmic ointment, PRN  lactobacillus (CULTURELLE) capsule 1 capsule, TID  polyethylene glycol (GLYCOLAX) packet 17 g, BID  sennosides-docusate sodium (SENOKOT-S) 8.6-50 MG tablet 2 tablet, QPM  0.9 % sodium chloride infusion, PRN  colchicine (COLCRYS) tablet 0.6 mg, Daily  oxyCODONE (ROXICODONE) immediate release tablet 5 mg, Q4H PRN  cefTRIAXone (ROCEPHIN) 2,000 mg in sterile water 20 mL IV syringe, Q24H  metronidazole (FLAGYL) 500 mg in 0.9% NaCl 100 mL IVPB premix, Q8H  sodium chloride flush 0.9 % injection 10 mL, 2 times per day  sodium chloride flush 0.9 % injection 10 mL, PRN  0.9 % sodium chloride infusion, PRN  ondansetron (ZOFRAN) injection 4 mg, Q6H PRN        Review of systems:     Heart: as above   Lungs: as above   Eyes: denies changes in vision or discharge. Ears: denies changes in hearing or pain. Nose: denies epistaxis or masses   Throat: denies sore throat or trouble swallowing. Neuro: denies numbness, tingling, tremors. Skin: denies rashes or itching. : denies hematuria, dysuria   GI: denies vomiting, diarrhea   Psych: denies mood changed, anxiety, depression. Physical exam:    Constitutional: A&O x3, communicates well, no acute distress. Eyes: extraocular muscles intact, PERRL. Normal lids & conjunctiva. No icterus. ENT: clear, no bleeding. No external masses. Lips normal formation. Neck: supple, full ROM, no JVD, no bruits, no lymphadenopathy. No masses. trachea midline. Heart: regular rate & rhythm, normal S1 & S2, no abnormal murmurs. No heave. Lungs: Mildly decreased on the left. No accessory muscles. Abd: soft, non-tender. Normal bowel sounds. Neuro: Full ROM X 4, EOMI, no tremors.   EXT: No

## 2023-03-26 ENCOUNTER — APPOINTMENT (OUTPATIENT)
Dept: GENERAL RADIOLOGY | Age: 23
DRG: 121 | End: 2023-03-26
Payer: MEDICAID

## 2023-03-26 LAB
ANION GAP SERPL CALCULATED.3IONS-SCNC: 9 MMOL/L (ref 7–16)
BUN SERPL-MCNC: 7 MG/DL (ref 6–20)
CALCIUM SERPL-MCNC: 8 MG/DL (ref 8.6–10.2)
CHLORIDE SERPL-SCNC: 103 MMOL/L (ref 98–107)
CO2 SERPL-SCNC: 22 MMOL/L (ref 22–29)
CREAT SERPL-MCNC: 0.6 MG/DL (ref 0.7–1.2)
ERYTHROCYTE [DISTWIDTH] IN BLOOD BY AUTOMATED COUNT: 13.6 FL (ref 11.5–15)
GLUCOSE SERPL-MCNC: 92 MG/DL (ref 74–99)
HCT VFR BLD AUTO: 43.3 % (ref 37–54)
HGB BLD-MCNC: 14.1 G/DL (ref 12.5–16.5)
MCH RBC QN AUTO: 28.8 PG (ref 26–35)
MCHC RBC AUTO-ENTMCNC: 32.6 % (ref 32–34.5)
MCV RBC AUTO: 88.4 FL (ref 80–99.9)
PLATELET # BLD AUTO: 276 E9/L (ref 130–450)
PMV BLD AUTO: 8.8 FL (ref 7–12)
POTASSIUM SERPL-SCNC: 3.8 MMOL/L (ref 3.5–5)
RBC # BLD AUTO: 4.9 E12/L (ref 3.8–5.8)
SODIUM SERPL-SCNC: 134 MMOL/L (ref 132–146)
VANCOMYCIN SERPL-MCNC: 4 MCG/ML (ref 5–40)
WBC # BLD: 11.9 E9/L (ref 4.5–11.5)

## 2023-03-26 PROCEDURE — 6360000002 HC RX W HCPCS: Performed by: NURSE PRACTITIONER

## 2023-03-26 PROCEDURE — 6370000000 HC RX 637 (ALT 250 FOR IP): Performed by: NURSE PRACTITIONER

## 2023-03-26 PROCEDURE — 6360000002 HC RX W HCPCS: Performed by: STUDENT IN AN ORGANIZED HEALTH CARE EDUCATION/TRAINING PROGRAM

## 2023-03-26 PROCEDURE — 2500000003 HC RX 250 WO HCPCS: Performed by: NURSE PRACTITIONER

## 2023-03-26 PROCEDURE — 2580000003 HC RX 258: Performed by: STUDENT IN AN ORGANIZED HEALTH CARE EDUCATION/TRAINING PROGRAM

## 2023-03-26 PROCEDURE — 36415 COLL VENOUS BLD VENIPUNCTURE: CPT

## 2023-03-26 PROCEDURE — 2060000000 HC ICU INTERMEDIATE R&B

## 2023-03-26 PROCEDURE — 80202 ASSAY OF VANCOMYCIN: CPT

## 2023-03-26 PROCEDURE — 99231 SBSQ HOSP IP/OBS SF/LOW 25: CPT | Performed by: INTERNAL MEDICINE

## 2023-03-26 PROCEDURE — 2580000003 HC RX 258: Performed by: NURSE PRACTITIONER

## 2023-03-26 PROCEDURE — 6360000002 HC RX W HCPCS: Performed by: INTERNAL MEDICINE

## 2023-03-26 PROCEDURE — 71045 X-RAY EXAM CHEST 1 VIEW: CPT

## 2023-03-26 PROCEDURE — 80048 BASIC METABOLIC PNL TOTAL CA: CPT

## 2023-03-26 PROCEDURE — 85027 COMPLETE CBC AUTOMATED: CPT

## 2023-03-26 RX ORDER — OXYCODONE HYDROCHLORIDE 5 MG/1
5 TABLET ORAL EVERY 4 HOURS PRN
Status: DISCONTINUED | OUTPATIENT
Start: 2023-03-26 | End: 2023-03-31 | Stop reason: HOSPADM

## 2023-03-26 RX ORDER — 0.9 % SODIUM CHLORIDE 0.9 %
1000 INTRAVENOUS SOLUTION INTRAVENOUS ONCE
Status: COMPLETED | OUTPATIENT
Start: 2023-03-27 | End: 2023-03-27

## 2023-03-26 RX ORDER — LORAZEPAM 2 MG/ML
0.5 INJECTION INTRAMUSCULAR EVERY 6 HOURS PRN
Status: DISCONTINUED | OUTPATIENT
Start: 2023-03-26 | End: 2023-03-31 | Stop reason: HOSPADM

## 2023-03-26 RX ORDER — MORPHINE SULFATE 2 MG/ML
2 INJECTION, SOLUTION INTRAMUSCULAR; INTRAVENOUS EVERY 4 HOURS PRN
Status: DISCONTINUED | OUTPATIENT
Start: 2023-03-26 | End: 2023-03-31 | Stop reason: HOSPADM

## 2023-03-26 RX ORDER — OXYCODONE HYDROCHLORIDE 10 MG/1
10 TABLET ORAL EVERY 4 HOURS PRN
Status: DISCONTINUED | OUTPATIENT
Start: 2023-03-26 | End: 2023-03-31 | Stop reason: HOSPADM

## 2023-03-26 RX ORDER — IPRATROPIUM BROMIDE AND ALBUTEROL SULFATE 2.5; .5 MG/3ML; MG/3ML
1 SOLUTION RESPIRATORY (INHALATION) EVERY 4 HOURS PRN
Status: DISCONTINUED | OUTPATIENT
Start: 2023-03-26 | End: 2023-03-31 | Stop reason: HOSPADM

## 2023-03-26 RX ORDER — POTASSIUM CHLORIDE 7.45 MG/ML
10 INJECTION INTRAVENOUS
Status: COMPLETED | OUTPATIENT
Start: 2023-03-26 | End: 2023-03-26

## 2023-03-26 RX ADMIN — ACETAMINOPHEN 650 MG: 325 TABLET ORAL at 15:13

## 2023-03-26 RX ADMIN — POTASSIUM CHLORIDE 10 MEQ: 7.46 INJECTION, SOLUTION INTRAVENOUS at 11:18

## 2023-03-26 RX ADMIN — ONDANSETRON 4 MG: 2 INJECTION INTRAMUSCULAR; INTRAVENOUS at 15:16

## 2023-03-26 RX ADMIN — METRONIDAZOLE 500 MG: 500 INJECTION, SOLUTION INTRAVENOUS at 15:25

## 2023-03-26 RX ADMIN — TRIMETHOBENZAMIDE HYDROCHLORIDE 200 MG: 100 INJECTION INTRAMUSCULAR at 03:12

## 2023-03-26 RX ADMIN — TRIMETHOBENZAMIDE HYDROCHLORIDE 200 MG: 100 INJECTION INTRAMUSCULAR at 20:00

## 2023-03-26 RX ADMIN — OXYCODONE HYDROCHLORIDE 5 MG: 5 TABLET ORAL at 15:15

## 2023-03-26 RX ADMIN — POLYETHYLENE GLYCOL 3350 17 G: 17 POWDER, FOR SOLUTION ORAL at 09:03

## 2023-03-26 RX ADMIN — SODIUM CHLORIDE 1000 ML: 9 INJECTION, SOLUTION INTRAVENOUS at 23:36

## 2023-03-26 RX ADMIN — SODIUM CHLORIDE, PRESERVATIVE FREE 10 ML: 5 INJECTION INTRAVENOUS at 20:46

## 2023-03-26 RX ADMIN — MUPIROCIN: 20 OINTMENT TOPICAL at 09:02

## 2023-03-26 RX ADMIN — MUPIROCIN: 20 OINTMENT TOPICAL at 22:03

## 2023-03-26 RX ADMIN — COLCHICINE 0.6 MG: 0.6 TABLET ORAL at 09:02

## 2023-03-26 RX ADMIN — METRONIDAZOLE 500 MG: 500 INJECTION, SOLUTION INTRAVENOUS at 00:00

## 2023-03-26 RX ADMIN — METRONIDAZOLE 500 MG: 500 INJECTION, SOLUTION INTRAVENOUS at 23:37

## 2023-03-26 RX ADMIN — SENNOSIDES AND DOCUSATE SODIUM 2 TABLET: 8.6; 5 TABLET ORAL at 17:57

## 2023-03-26 RX ADMIN — ONDANSETRON 4 MG: 2 INJECTION INTRAMUSCULAR; INTRAVENOUS at 23:23

## 2023-03-26 RX ADMIN — Medication 1 CAPSULE: at 15:15

## 2023-03-26 RX ADMIN — Medication 1 CAPSULE: at 11:18

## 2023-03-26 RX ADMIN — POTASSIUM CHLORIDE 10 MEQ: 7.46 INJECTION, SOLUTION INTRAVENOUS at 09:10

## 2023-03-26 RX ADMIN — LORAZEPAM 0.5 MG: 2 INJECTION INTRAMUSCULAR; INTRAVENOUS at 20:44

## 2023-03-26 RX ADMIN — SODIUM CHLORIDE, PRESERVATIVE FREE 10 ML: 5 INJECTION INTRAVENOUS at 09:04

## 2023-03-26 RX ADMIN — CEFTRIAXONE SODIUM 2000 MG: 2 INJECTION, POWDER, FOR SOLUTION INTRAMUSCULAR; INTRAVENOUS at 15:17

## 2023-03-26 RX ADMIN — METRONIDAZOLE 500 MG: 500 INJECTION, SOLUTION INTRAVENOUS at 09:30

## 2023-03-26 RX ADMIN — ACETAMINOPHEN 650 MG: 325 TABLET ORAL at 09:03

## 2023-03-26 RX ADMIN — ENOXAPARIN SODIUM 40 MG: 100 INJECTION SUBCUTANEOUS at 09:03

## 2023-03-26 ASSESSMENT — PAIN - FUNCTIONAL ASSESSMENT
PAIN_FUNCTIONAL_ASSESSMENT: PREVENTS OR INTERFERES SOME ACTIVE ACTIVITIES AND ADLS
PAIN_FUNCTIONAL_ASSESSMENT: ACTIVITIES ARE NOT PREVENTED
PAIN_FUNCTIONAL_ASSESSMENT: ACTIVITIES ARE NOT PREVENTED

## 2023-03-26 ASSESSMENT — PAIN DESCRIPTION - LOCATION
LOCATION: INCISION
LOCATION: RIB CAGE
LOCATION: INCISION;RIB CAGE

## 2023-03-26 ASSESSMENT — PAIN DESCRIPTION - ORIENTATION
ORIENTATION: LEFT

## 2023-03-26 ASSESSMENT — PAIN SCALES - GENERAL
PAINLEVEL_OUTOF10: 6
PAINLEVEL_OUTOF10: 7

## 2023-03-26 ASSESSMENT — PAIN DESCRIPTION - DESCRIPTORS
DESCRIPTORS: ACHING;DULL;JABBING
DESCRIPTORS: ACHING;DISCOMFORT;DULL
DESCRIPTORS: ACHING;BURNING;CRUSHING

## 2023-03-26 NOTE — PROGRESS NOTES
morphine, ipratropium-albuterol, melatonin, bisacodyl, Menthol, sodium chloride, trimethobenzamide, diphenhydrAMINE, artificial tears, sodium chloride, sodium chloride flush, sodium chloride, ondansetron    I/O    Intake/Output Summary (Last 24 hours) at 3/26/2023 1444  Last data filed at 3/26/2023 1428  Gross per 24 hour   Intake 133.4 ml   Output 2160 ml   Net -2026.6 ml         Labs:   Recent Labs     03/24/23  1011 03/24/23  1155 03/25/23  0538 03/25/23 2006 03/26/23  0544   WBC 12.2*  --  10.7  --  11.9*   HGB 13.8   < > 14.2 14.5 14.1   HCT 42.9   < > 44.1 45.0 43.3     --  297  --  276    < > = values in this interval not displayed. Recent Labs     03/24/23  1011 03/25/23  0538 03/26/23  0544    133 134   K 4.4 3.9 3.8   * 102 103   CO2 19* 21* 22   BUN 9 6 7   CREATININE 0.8 0.7 0.6*   CALCIUM 8.2* 8.2* 8.0*         No results for input(s): PROT, ALB, ALKPHOS, ALT, AST, BILITOT, AMYLASE, LIPASE in the last 72 hours. No results for input(s): INR in the last 72 hours. No results for input(s): [employer] Medal in the last 72 hours. Chronic labs:  Lab Results   Component Value Date    INR 1.6 03/22/2023       Microbiology:  Pending  No results for input(s): BC in the last 72 hours. No results for input(s): ORG in the last 72 hours. No results for input(s): Sharlene Patricio in the last 72 hours. No results for input(s): STREPNEUMAGU in the last 72 hours. No results for input(s): LP1UAG in the last 72 hours. No results for input(s): ASO in the last 72 hours. No results for input(s): CULTRESP in the last 72 hours. No results for input(s): PROCAL in the last 72 hours. Radiology:  CT CHEST W CONTRAST    Result Date: 3/22/2023  Cardiac size nonenlarged with pericardial effusion most notable along the right atrium margin up to 3 cm thickness.  Along the left ventricular free wall there is pericardial effusion fluid signal which becomes coalescent with pericardial fat pad aerated lung near the apex. 3.  No free-flowing pleural effusions could be identified. RECOMMENDATIONS A CT scan of the thorax for better characterization of the thickened pleural process on the left may be helpful       ASSESSMENT:    Principal Problem:    Acute appendicitis  Active Problems:    Loculated pleural effusion    Vancomycin adverse reaction    Pericardial effusion    Ex-smoker    ADHD    Current every day vaping    H/o Lyme disease  Resolved Problems:    * No resolved hospital problems. *       PLAN:    1 no abdominal complaints and initial evaluation for acute appendicitis did not indicate by surgery that he needed a surgical procedure. 2.  Left-sided effusion now status post VATS procedure. Thoracentesis was initially attempted but fluid was too thick. Serologies in place for autoimmune and immunosuppression. 3.  Discomfort from the VATS procedure seems to be under control  4. Some degree of pericardial effusion however no tamponade. Repeat ECHO tomorrow per cardiology   5. Pathology of tissue awaited from left chest mass      Diet: ADULT DIET; Regular  Code Status: Full Code  PT/OT Eval Status:   Order  DVT Prophylaxis:   In place  Recommended disposition at discharge: Likely home    +++++++++++++++++++++++++++++++++++++++++++++++++  Rayshawn Jenkins MD   [facility].  +++++++++++++++++++++++++++++++++++++++++++++++++  NOTE: This report was transcribed using voice recognition software. Every effort was made to ensure accuracy; however, inadvertent computerized transcription errors may be present.

## 2023-03-26 NOTE — PROGRESS NOTES
Pharmacy Consultation Note  (Antibiotic Dosing and Monitoring)    Initial consult date: 3/24/23  Consulting physician/provider: Sunny Murcia MD  Drug: Vancomycin  Indication: Pneumonia (CAP); 7 days    Vancomycin has been discontinued. Clinical Pharmacy to sign-off. Physician to re-consult pharmacy if future dosing is needed. Thank you for the consult,    Rafael Young, PharmD, [address]  PGY1 Pharmacy Resident     3/26/2023 3:19 PM         Pharmacy Consultation Note  (Antibiotic Dosing and Monitoring)    Initial consult date: 3/24/23  Consulting physician/provider: Sunny Murcia MD  Drug: Vancomycin  Indication: Pneumonia (CAP); 7 days    Age/  Gender Height Weight IBW  Allergy Information   23 y.o./male 5' 11\" (180.3 cm) 165 lb (74.8 kg)     Ideal body weight: 75.3 kg (166 lb 0.1 oz)   Vancomycin      Renal Function:  Recent Labs     03/24/23  1011 03/25/23  0538 03/26/23  0544   BUN 9 6 7   CREATININE 0.8 0.7 0.6*         Intake/Output Summary (Last 24 hours) at 3/26/2023 0945  Last data filed at 3/26/2023 3985  Gross per 24 hour   Intake 73.4 ml   Output 3215 ml   Net -3141.6 ml         Vancomycin Monitoring:  Trough:  No results for input(s): VANCOTROUGH in the last 72 hours. Random:    Recent Labs     03/26/23  0544   VANCORANDOM 4.0*       No results for input(s): Coty Ivanoff in the last 72 hours. Historical Cultures:  No results found for: ORG  No results for input(s): BC in the last 72 hours.     Vancomycin Administration Times:  Recent vancomycin administrations                     vancomycin 1500 mg in sodium chloride 0.9% 300 mL IVPB (mg) 1,500 mg New Bag 03/25/23 1914     1,500 mg New Bag  0526     1,500 mg New Bag 03/24/23 1800    vancomycin (VANCOCIN) 1,750 mg in sodium chloride 0.9 % 500 mL IVPB (mg) 1,750 mg New Bag 03/23/23 1850                      Assessment:  Patient is a 21 y.o. male who has been initiated on vancomycin  Estimated Creatinine Clearance: 203 mL/min (A) (based on SCr of 0.6 mg/dL (L)). To dose vancomycin, pharmacy will be utilizing [employer] calculation software for goal AUC/HENRY 400-600 mg/L-hr    Plan:   Will continue vancomycin 1250 mg IV every 8 hours - to be run slowly  Will check vancomycin levels when appropriate  Will continue to monitor renal function   Pharmacy to follow    Nayla Singh, KirstenD, [facility]  3/26/2023  9:45 AM

## 2023-03-26 NOTE — PROGRESS NOTES
PROGRESS NOTE     CARDIOLOGY    Chief complaint: Seen today for follow up, management & recommendations for pericardial effusion    He denies chest pain or shortness of breath today. He was reclining nearly flat in bed appears comfortable and in no distress. Wt Readings from Last 3 Encounters:   03/21/23 165 lb (74.8 kg)     Temp Readings from Last 3 Encounters:   03/26/23 98.4 °F (36.9 °C) (Temporal)     BP Readings from Last 3 Encounters:   03/26/23 97/60     Pulse Readings from Last 3 Encounters:   03/26/23 (!) 104         Intake/Output Summary (Last 24 hours) at 3/26/2023 0902  Last data filed at 3/26/2023 0732  Gross per 24 hour   Intake 73.4 ml   Output 3215 ml   Net -3141.6 ml       Recent Labs     03/24/23  1011 03/24/23  1155 03/25/23  0538 03/25/23 2006 03/26/23  0544   WBC 12.2*  --  10.7  --  11.9*   HGB 13.8   < > 14.2 14.5 14.1   HCT 42.9   < > 44.1 45.0 43.3   MCV 89.9  --  88.7  --  88.4     --  297  --  276    < > = values in this interval not displayed. Recent Labs     03/24/23  1011 03/25/23  0538 03/26/23  0544    133 134   K 4.4 3.9 3.8   * 102 103   CO2 19* 21* 22   BUN 9 6 7   CREATININE 0.8 0.7 0.6*   MG 1.8  --   --      No results for input(s): PROTIME, INR in the last 72 hours. No results for input(s): CKTOTAL, CKMB, CKMBINDEX, TROPONINI in the last 72 hours. No results for input(s): BNP in the last 72 hours. No results for input(s): CHOL, HDL, TRIG in the last 72 hours. Invalid input(s): CHOLHDLR, LDLCALCU  No results for input(s): TROPHS in the last 72 hours.       potassium chloride 10 mEq/100 mL IVPB (Peripheral Line), Q2H  enoxaparin (LOVENOX) injection 40 mg, Daily  melatonin disintegrating tablet 5 mg, Nightly PRN  acetaminophen (TYLENOL) tablet 650 mg, Q6H  bisacodyl (DULCOLAX) EC tablet 5 mg, Daily PRN  ipratropium-albuterol (DUONEB) nebulizer solution 1 ampule, Q4H WA  Menthol LOZG 1 lozenge, Q2H PRN  mupirocin (BACTROBAN) 2 % ointment,

## 2023-03-26 NOTE — PROGRESS NOTES
Patient called RN to assess IV which was puffy, red and pt complained of it being itchy. RN attempted to pull back IV and there was no blood returned. Attempted to restart IV vancomycin in opposite arm but patient was nervous he would have another reaction. Educated patient on importance of IV antibiotics but he refused. Ordering provider, Dr. Mariola Mace notified.

## 2023-03-26 NOTE — PROGRESS NOTES
[address] [place] [address] [facility]  [facility]  Progress note     HISTORY OF PRESENT ILLNESS:   24-year-old male with no past medical history presented with right lower quadrant abdominal pain. CT abdomen showed possible acute appendicitis. CT chest shows large left-sided loculated pleural effusion with possible pneumonia and empyema. CT surgery already evaluated for possible VATS in a.m. ID consulted for left-sided pleural effusion/empyema. Past Medical History:    No past medical history on file. Past Surgical History:    No past surgical history on file. Current Medications:   Scheduled Meds:   enoxaparin  40 mg SubCUTAneous Daily    acetaminophen  650 mg Oral Q6H    mupirocin   Topical BID    lactobacillus  1 capsule Oral TID    polyethylene glycol  17 g Oral BID    sennosides-docusate sodium  2 tablet Oral QPM    colchicine  0.6 mg Oral Daily    cefTRIAXone (ROCEPHIN) IV  2,000 mg IntraVENous Q24H    metroNIDAZOLE  500 mg IntraVENous Q8H    sodium chloride flush  10 mL IntraVENous 2 times per day     Continuous Infusions:   sodium chloride      sodium chloride       PRN Meds:perflutren lipid microspheres, oxyCODONE **OR** oxyCODONE, morphine, ipratropium-albuterol, melatonin, bisacodyl, Menthol, sodium chloride, trimethobenzamide, diphenhydrAMINE, artificial tears, sodium chloride, sodium chloride flush, sodium chloride, ondansetron    Allergies:  Vancomycin    Social History:   Social History     Socioeconomic History    Marital status: Single     Spouse name: None    Number of children: None    Years of education: None    Highest education level: None   Tobacco Use    Smoking status: Never   Vaping Use    Vaping Use: Every day    Substances: Nicotine, Flavoring    Devices: Disposable, Pre-filled or refillable cartridge   Substance and Sexual Activity    Alcohol use: Defer    Drug use: Defer     Tobacco: No  Alcohol: No  Pets: No  Travel: No    Family History:   No family history on file. Ross Mayer lung related to edema   or pneumonia with left pleural effusion. XR CHEST PORTABLE   Final Result   Minimally improved aeration of the left lung. Moderate/large loculated left pleural effusion with extensive left lung   opacity. Cardiomegaly. XR CHEST PORTABLE   Final Result   No interval change from 03/22/2023 exam         XR CHEST PORTABLE   Final Result   There is significant pleural thickening and consolidative airspace disease   projecting over the entire left hemithorax with only a small segment of   aerosolized lung and additional imaging such as CT scan may be of increased   sensitivity         IR US CHEST PLEURAL SPACE   Final Result   1. Ultrasound localization of the left thorax      2. There is dense pleural thickening of the left thorax with only a small   volume of aerated lung near the apex. 3.  No free-flowing pleural effusions could be identified. RECOMMENDATIONS   A CT scan of the thorax for better characterization of the thickened pleural   process on the left may be helpful         CT CHEST W CONTRAST   Final Result   Cardiac size nonenlarged with pericardial effusion most notable along the   right atrium margin up to 3 cm thickness. Along the left ventricular free   wall there is pericardial effusion fluid signal which becomes coalescent with   pericardial fat pad edema and a left pleural fluid collection. Considerable   volume loss of a largely atelectatic and opacified left hemithorax with   heterogeneous signal of a left moderate pleural fluid collection could   represent empyema or chronic effusion as considerations with atelectatic   changes of the remaining left upper lung minimally aerated. Further cardiac   imaging may be performed to evaluate for cardiac motion restriction such as   echo         CT ABDOMEN PELVIS W IV CONTRAST Additional Contrast? None   Final Result   1. Findings above suggest acute appendicitis.    2. Small free fluid in the

## 2023-03-26 NOTE — RT PROTOCOL NOTE
Protocol order mode. 4-6 - enter or revise RT Bronchodilator order(s) to two equivalent RT bronchodilator orders with one order with BID Frequency and one order with Frequency of every 4 hours PRN wheezing or increased work of breathing using Per Protocol order mode. 7-10 - enter or revise RT Bronchodilator order(s) to two equivalent RT bronchodilator orders with one order with TID Frequency and one order with Frequency of every 4 hours PRN wheezing or increased work of breathing using Per Protocol order mode. 11-13 - enter or revise RT Bronchodilator order(s) to one equivalent RT bronchodilator order with QID Frequency and an Albuterol order with Frequency of every 4 hours PRN wheezing or increased work of breathing using Per Protocol order mode. Greater than 13 - enter or revise RT Bronchodilator order(s) to one equivalent RT bronchodilator order with every 4 hours Frequency and an Albuterol order with Frequency of every 2 hours PRN wheezing or increased work of breathing using Per Protocol order mode. Patient also feels he does not need these breathing treatments as he does not feel that they help. Is aware to call for prn if needed    Electronically signed by Sly Valdez.  Armand Tucker on 3/26/2023 at 12:24 PM

## 2023-03-26 NOTE — PROGRESS NOTES
Patient had 1 episode of vomitus. Pt reported it felt like he was \"throwing up hot sauce\". Tigan given. Surgery resident made aware. VSS.

## 2023-03-26 NOTE — PATIENT CARE CONFERENCE
[facility] Quality Flow/Interdisciplinary Rounds Progress Note        Quality Flow Rounds held on March 26, 2023    Disciplines Attending:  Bedside Nurse, , , and Nursing Unit Leadership    Aidan Blankenship III was admitted on 3/21/2023  2:48 PM    Anticipated Discharge Date:  Expected Discharge Date: 03/23/23    Disposition:    Arash Score:  Arash Scale Score: 20    Readmission Risk              Risk of Unplanned Readmission:  9           Discussed patient goal for the day, patient clinical progression, and barriers to discharge.   The following Goal(s) of the Day/Commitment(s) have been identified:  ambulate/dc plan      Darci Davis RN  March 26, 2023

## 2023-03-26 NOTE — PROGRESS NOTES
POD#2 Awake, alert. No complaints. No longer wants PCA, states it makes him nauseated. Denies CP, palpitations, SOB at rest, dizziness/lightheadedness. Vitals:    03/25/23 2110 03/26/23 0000 03/26/23 0330 03/26/23 0717   BP:  (!) 104/56 101/60 97/60   Pulse:  74 (!) 113 (!) 104   Resp: 16 10 16 16   Temp:  98.1 °F (36.7 °C) 97.6 °F (36.4 °C) 98.4 °F (36.9 °C)   TempSrc:  Oral Axillary Temporal   SpO2:  94% 93% 97%   Weight:       Height:         O2: none      Intake/Output Summary (Last 24 hours) at 3/26/2023 0747  Last data filed at 3/26/2023 0732  Gross per 24 hour   Intake 73.4 ml   Output 3215 ml   Net -3141.6 ml         CT output:    Pleural: 620mL/8hr (1640mL/24hrs)      Recent Labs     03/24/23  1011 03/24/23  1155 03/25/23  0538 03/25/23 2006 03/26/23  0544   WBC 12.2*  --  10.7  --  11.9*   HGB 13.8   < > 14.2 14.5 14.1   HCT 42.9   < > 44.1 45.0 43.3     --  297  --  276    < > = values in this interval not displayed. Recent Labs     03/24/23  1011 03/25/23  0538 03/26/23  0544   BUN 9 6 7   CREATININE 0.8 0.7 0.6*         Telemetry: SR/ST (HR up to 100's)      PE  Cardiac: RRR, int tachycardia  Lungs: decreased bases  Chest incision with DSD C/D/I. Chest tube x 1 present and secure. Abd: Soft, nontender, +BS  Ext: TABARES              POD#2     A/P:      1) Loculated pleural effusion  --Stable s/p L VATS, drainage of pleural effusion, chest wall biopsy on 3/24/23  --follow biopsy results  --VSS  --tolerating RA  --chest tube without continued output and no airleak.  Continue chest tube until improvement in drainage amount  --Increase activity as tolerated  --Encourage incentive spirometry   --lovenox for dvt prophylaxis        2) Expected acute post operative anemia secondary to surgery  --stable--hgb has remained stable, stop Q12h h/h        3) Post operative pain intolerance  --will stop PCA per patient request, continue oral pain medication and add IV morphine for BTP only         4) Constipation--expected delayed return of bowel function  --secondary to anesthesia, narcotics, decreased oral intake, and decreased physical activity   --continue ordered bowel regimen           Dispo: continue supportive care            This patient's case and care plan was discussed with the attending surgeon

## 2023-03-27 ENCOUNTER — APPOINTMENT (OUTPATIENT)
Dept: GENERAL RADIOLOGY | Age: 23
DRG: 121 | End: 2023-03-27
Payer: MEDICAID

## 2023-03-27 PROBLEM — I95.9 HYPOTENSION: Status: ACTIVE | Noted: 2023-03-27

## 2023-03-27 LAB
(1,3)-BETA-D-GLUCAN (FUNGITELL) INTERPRETATION: NEGATIVE
1,3 BETA GLUCAN SER-MCNC: <31 PG/ML
ANION GAP SERPL CALCULATED.3IONS-SCNC: 7 MMOL/L (ref 7–16)
BACTERIA FLD AEROBE CULT: NORMAL
BUN SERPL-MCNC: 3 MG/DL (ref 6–20)
CALCIUM SERPL-MCNC: 8.1 MG/DL (ref 8.6–10.2)
CHLORIDE SERPL-SCNC: 101 MMOL/L (ref 98–107)
CO2 SERPL-SCNC: 26 MMOL/L (ref 22–29)
CREAT SERPL-MCNC: 0.7 MG/DL (ref 0.7–1.2)
ERYTHROCYTE [DISTWIDTH] IN BLOOD BY AUTOMATED COUNT: 13.7 FL (ref 11.5–15)
GALACTOMANNAN AG SERPL QL IA: NEGATIVE
GALACTOMANNAN AG SERPL-ACNC: 0.07
GLUCOSE SERPL-MCNC: 87 MG/DL (ref 74–99)
GRAM STN SPEC: NORMAL
HCT VFR BLD AUTO: 39.1 % (ref 37–54)
HCT VFR BLD AUTO: 40.4 % (ref 37–54)
HGB BLD-MCNC: 12.8 G/DL (ref 12.5–16.5)
HGB BLD-MCNC: 13.3 G/DL (ref 12.5–16.5)
LV EF: 65 %
LVEF MODALITY: NORMAL
MCH RBC QN AUTO: 28.8 PG (ref 26–35)
MCHC RBC AUTO-ENTMCNC: 32.7 % (ref 32–34.5)
MCV RBC AUTO: 87.9 FL (ref 80–99.9)
PLATELET # BLD AUTO: 289 E9/L (ref 130–450)
PMV BLD AUTO: 9 FL (ref 7–12)
POTASSIUM SERPL-SCNC: 3.7 MMOL/L (ref 3.5–5)
RBC # BLD AUTO: 4.45 E12/L (ref 3.8–5.8)
SODIUM SERPL-SCNC: 134 MMOL/L (ref 132–146)
WBC # BLD: 8 E9/L (ref 4.5–11.5)

## 2023-03-27 PROCEDURE — 2580000003 HC RX 258: Performed by: NURSE PRACTITIONER

## 2023-03-27 PROCEDURE — 6370000000 HC RX 637 (ALT 250 FOR IP): Performed by: NURSE PRACTITIONER

## 2023-03-27 PROCEDURE — 85027 COMPLETE CBC AUTOMATED: CPT

## 2023-03-27 PROCEDURE — 2500000003 HC RX 250 WO HCPCS: Performed by: NURSE PRACTITIONER

## 2023-03-27 PROCEDURE — 6360000002 HC RX W HCPCS: Performed by: NURSE PRACTITIONER

## 2023-03-27 PROCEDURE — 2060000000 HC ICU INTERMEDIATE R&B

## 2023-03-27 PROCEDURE — 36415 COLL VENOUS BLD VENIPUNCTURE: CPT

## 2023-03-27 PROCEDURE — 93308 TTE F-UP OR LMTD: CPT

## 2023-03-27 PROCEDURE — 80048 BASIC METABOLIC PNL TOTAL CA: CPT

## 2023-03-27 PROCEDURE — 71045 X-RAY EXAM CHEST 1 VIEW: CPT

## 2023-03-27 PROCEDURE — 6360000002 HC RX W HCPCS: Performed by: STUDENT IN AN ORGANIZED HEALTH CARE EDUCATION/TRAINING PROGRAM

## 2023-03-27 PROCEDURE — 99232 SBSQ HOSP IP/OBS MODERATE 35: CPT | Performed by: INTERNAL MEDICINE

## 2023-03-27 PROCEDURE — 2580000003 HC RX 258: Performed by: INTERNAL MEDICINE

## 2023-03-27 PROCEDURE — 85014 HEMATOCRIT: CPT

## 2023-03-27 PROCEDURE — 6360000002 HC RX W HCPCS: Performed by: INTERNAL MEDICINE

## 2023-03-27 PROCEDURE — 85018 HEMOGLOBIN: CPT

## 2023-03-27 RX ORDER — SODIUM CHLORIDE 9 MG/ML
INJECTION, SOLUTION INTRAVENOUS CONTINUOUS
Status: DISCONTINUED | OUTPATIENT
Start: 2023-03-27 | End: 2023-03-28

## 2023-03-27 RX ADMIN — ONDANSETRON 4 MG: 2 INJECTION INTRAMUSCULAR; INTRAVENOUS at 08:15

## 2023-03-27 RX ADMIN — Medication 1 CAPSULE: at 21:32

## 2023-03-27 RX ADMIN — METRONIDAZOLE 500 MG: 500 INJECTION, SOLUTION INTRAVENOUS at 08:25

## 2023-03-27 RX ADMIN — SODIUM CHLORIDE: 9 INJECTION, SOLUTION INTRAVENOUS at 21:31

## 2023-03-27 RX ADMIN — CEFTRIAXONE SODIUM 2000 MG: 2 INJECTION, POWDER, FOR SOLUTION INTRAMUSCULAR; INTRAVENOUS at 15:57

## 2023-03-27 RX ADMIN — METRONIDAZOLE 500 MG: 500 INJECTION, SOLUTION INTRAVENOUS at 16:03

## 2023-03-27 RX ADMIN — ACETAMINOPHEN 650 MG: 325 TABLET ORAL at 21:31

## 2023-03-27 RX ADMIN — ONDANSETRON 4 MG: 2 INJECTION INTRAMUSCULAR; INTRAVENOUS at 21:32

## 2023-03-27 RX ADMIN — ENOXAPARIN SODIUM 40 MG: 100 INJECTION SUBCUTANEOUS at 08:16

## 2023-03-27 RX ADMIN — METRONIDAZOLE 500 MG: 500 INJECTION, SOLUTION INTRAVENOUS at 23:21

## 2023-03-27 RX ADMIN — ACETAMINOPHEN 650 MG: 325 TABLET ORAL at 08:15

## 2023-03-27 RX ADMIN — Medication 5 MG: at 21:31

## 2023-03-27 RX ADMIN — POLYETHYLENE GLYCOL 3350 17 G: 17 POWDER, FOR SOLUTION ORAL at 08:16

## 2023-03-27 RX ADMIN — SODIUM CHLORIDE, PRESERVATIVE FREE 10 ML: 5 INJECTION INTRAVENOUS at 08:16

## 2023-03-27 RX ADMIN — LORAZEPAM 0.5 MG: 2 INJECTION INTRAMUSCULAR; INTRAVENOUS at 21:32

## 2023-03-27 RX ADMIN — COLCHICINE 0.6 MG: 0.6 TABLET ORAL at 08:15

## 2023-03-27 RX ADMIN — SODIUM CHLORIDE: 9 INJECTION, SOLUTION INTRAVENOUS at 15:52

## 2023-03-27 ASSESSMENT — PAIN DESCRIPTION - ORIENTATION
ORIENTATION: LEFT
ORIENTATION: MID

## 2023-03-27 ASSESSMENT — PAIN DESCRIPTION - DESCRIPTORS
DESCRIPTORS: DISCOMFORT;BURNING;SHARP
DESCRIPTORS: ACHING;DISCOMFORT;SORE

## 2023-03-27 ASSESSMENT — PAIN SCALES - GENERAL
PAINLEVEL_OUTOF10: 2
PAINLEVEL_OUTOF10: 2
PAINLEVEL_OUTOF10: 6

## 2023-03-27 ASSESSMENT — PAIN DESCRIPTION - LOCATION
LOCATION: ABDOMEN
LOCATION: RIB CAGE

## 2023-03-27 ASSESSMENT — PAIN - FUNCTIONAL ASSESSMENT: PAIN_FUNCTIONAL_ASSESSMENT: ACTIVITIES ARE NOT PREVENTED

## 2023-03-27 NOTE — PROGRESS NOTES
joints  Neurological: Following commands, no focal neurodeficit  Lines: peripheral      CBC+dif:  Recent Labs     03/25/23  0538 03/25/23 2006 03/26/23  0544 03/27/23  0800   WBC 10.7  --  11.9* 8.0   HGB 14.2   < > 14.1 12.8   HCT 44.1   < > 43.3 39.1   MCV 88.7  --  88.4 87.9     --  276 289   NEUTROABS 8.88*  --   --   --     < > = values in this interval not displayed. Lab Results   Component Value Date    CRP 4.8 (H) 03/23/2023     No results found for: CRPHS  Lab Results   Component Value Date    SEDRATE 20 (H) 03/23/2023     Lab Results   Component Value Date    ALT 8 03/23/2023    AST 12 03/23/2023    ALKPHOS 121 03/23/2023    BILITOT 0.5 03/23/2023     Lab Results   Component Value Date/Time     03/27/2023 08:00 AM    K 3.7 03/27/2023 08:00 AM    K 4.0 03/23/2023 07:54 AM     03/27/2023 08:00 AM    CO2 26 03/27/2023 08:00 AM    BUN 3 03/27/2023 08:00 AM    CREATININE 0.7 03/27/2023 08:00 AM    LABGLOM >60 03/27/2023 08:00 AM    GLUCOSE 87 03/27/2023 08:00 AM    PROT 7.0 03/23/2023 07:54 AM    LABALBU 3.7 03/23/2023 07:54 AM    CALCIUM 8.1 03/27/2023 08:00 AM    BILITOT 0.5 03/23/2023 07:54 AM    ALKPHOS 121 03/23/2023 07:54 AM    AST 12 03/23/2023 07:54 AM    ALT 8 03/23/2023 07:54 AM       Lab Results   Component Value Date/Time    PROTIME 17.6 03/22/2023 08:53 AM    INR 1.6 03/22/2023 08:53 AM       No results found for: TSH    No results found for: NITRITE, COLORU, PHUR, LABCAST, WBCUA, RBCUA, MUCUS, TRICHOMONAS, YEAST, BACTERIA, CLARITYU, SPECGRAV, LEUKOCYTESUR, UROBILINOGEN, BILIRUBINUR, BLOODU, GLUCOSEU, AMORPHOUS    No results found for: DPW8PEM, BEART, W1DMWBDG, PHART, THGBART, WFL8JGW, PO2ART, JBX5CYZ  Radiology:  XR CHEST PORTABLE   Final Result   Unchanged large left pleural effusion with likely adjacent atelectasis and   stable left chest tube. XR CHEST PORTABLE   Final Result   1.  There continues to be extensive volume loss a left thorax raising the   possibility cardiac motion restriction such as   echo         CT ABDOMEN PELVIS W IV CONTRAST Additional Contrast? None   Final Result   1. Findings above suggest acute appendicitis. 2. Small free fluid in the pelvis. 3. Mild distension of the gallbladder with suspected small pericholecystic   fluid versus gallbladder wall thickening. Ultrasound gallbladder could be   helpful for further evaluation. 4. Volume loss associated with visualized left lower lung with subpleural   effusion of increased attenuation. Clinical correlation recommended. 5. Atelectatic changes are also present in visualized left lung base. 6. Moderate pericardial effusion. XR CHEST PORTABLE    (Results Pending)   CT ABDOMEN W CONTRAST Additional Contrast? None    (Results Pending)       Microbiology:  Pending  No results for input(s): BC in the last 72 hours. No results for input(s): ORG in the last 72 hours. No results for input(s): Satya Fleeting in the last 72 hours. No results for input(s): STREPNEUMAGU in the last 72 hours. No results for input(s): LP1UAG in the last 72 hours. No results for input(s): ASO in the last 72 hours. No results for input(s): CULTRESP in the last 72 hours. Assessment:  Large left-sided loculated pleural effusion  Community-acquired pneumonia  Acute appendicitis  Pericardial effusion without tamponade  Status post left-sided VATS with pleural biopsy and pleural fluid drainage  Vancomycin allergy with rash    Plan:    Continue ceftriaxone and Flagyl. Patient developed rash with vancomycin, tried with Benadryl prior to vancomycin administration but patient developed rash on the arm. DC vancomycin  Surgical cultures are no growth so far. HIV negative, Fungitell and serum galactomannan pending  Hepatitis screen pending  Left-sided chest tube has bloody drainage. Follow with CT surgery.     ID will continue to follow  Surgical culture and pleural fluid cultures are no growth so far  Chest tube still in

## 2023-03-27 NOTE — PROGRESS NOTES
recommended. 5. Atelectatic changes are also present in visualized left lung base. 6. Moderate pericardial effusion. XR CHEST PORTABLE    (Results Pending)   CT ABDOMEN W CONTRAST Additional Contrast? None    (Results Pending)       Lab Review   Lab Results   Component Value Date/Time     03/27/2023 08:00 AM    K 3.7 03/27/2023 08:00 AM    K 4.0 03/23/2023 07:54 AM     03/27/2023 08:00 AM    CO2 26 03/27/2023 08:00 AM    BUN 3 03/27/2023 08:00 AM    CREATININE 0.7 03/27/2023 08:00 AM    GLUCOSE 87 03/27/2023 08:00 AM    CALCIUM 8.1 03/27/2023 08:00 AM     Lab Results   Component Value Date/Time    WBC 8.0 03/27/2023 08:00 AM    HGB 12.8 03/27/2023 08:00 AM    HCT 39.1 03/27/2023 08:00 AM    MCV 87.9 03/27/2023 08:00 AM     03/27/2023 08:00 AM     I have personally reviewed the laboratory, cardiac diagnostic and radiographic testing as outlined above:    Assessment:     1. Pericardial effusion: Moderate, stable compared to an echocardiogram done on the 23rd, will continue current treatment  2. Loculated pleural effusion: S/p VATS and chest tube placement  3. Hypotension: Negative almost 2 L since admission, will start IV fluids    Recommendations:     1. IV fluids  2. Continue current treatment  3. Basic metabolic panel and CBC in a.m.  4.  Further cardiac recommendations will be forthcoming pending his clinical course and diagnostic test findings    Discussed with patient  Discussed with nursing staff      Electronically signed by Nanette Whitley MD on 3/27/2023 at 2:56 PM  NOTE: This report was transcribed using voice recognition software.  Every effort was made to ensure accuracy; however, inadvertent computerized transcription errors may be present

## 2023-03-27 NOTE — PROGRESS NOTES
POD#3 Awake, alert. C/o nausea when he eats. Episode of emesis yesterday evening. NPO per primary service. Denies CP, palpitations, SOB at rest, dizziness/lightheadedness. Vitals:    03/26/23 2046 03/26/23 2117 03/26/23 2320 03/27/23 0245   BP:   (!) 88/52 97/60   Pulse:   88 (!) 104   Resp: 12 12 10 10   Temp:   97.4 °F (36.3 °C) 98.4 °F (36.9 °C)   TempSrc:   Temporal Temporal   SpO2:   95% 96%   Weight:       Height:         O2: none      Intake/Output Summary (Last 24 hours) at 3/27/2023 0745  Last data filed at 3/27/2023 0720  Gross per 24 hour   Intake 360 ml   Output 440 ml   Net -80 ml         UO: Voids without difficulty    CT output:    Pleural: 100mL/8hr (500mL/24hrs)      Recent Labs     03/24/23  1011 03/24/23  1155 03/25/23  0538 03/25/23 2006 03/26/23  0544   WBC 12.2*  --  10.7  --  11.9*   HGB 13.8   < > 14.2 14.5 14.1   HCT 42.9   < > 44.1 45.0 43.3     --  297  --  276    < > = values in this interval not displayed. Recent Labs     03/24/23  1011 03/25/23  0538 03/26/23  0544   BUN 9 6 7   CREATININE 0.8 0.7 0.6*         Telemetry: SR      PE  Cardiac: RRR  Lungs: decreased bases  Chest incision with DSD C/D/I. Chest tube x 1 present and secure. Abd: Soft, nontender, +BS  Ext: TABARES              POD#3     A/P:      1) Loculated pleural effusion  --Stable s/p L VATS, drainage of pleural effusion, chest wall biopsy on 3/24/23  --follow biopsy results  --VSS  --tolerating RA  --chest tube without continued output and no airleak. Continue chest tube until improvement in drainage amount--greatly improved over the last 24hrs; however clotting present in chest tube that has been milked out so will closely monitor further output.  Continue chest tube today, hopefully be able to remove tomorrow  --Increase activity as tolerated  --Encourage incentive spirometry   --lovenox for dvt prophylaxis        2) Expected acute post operative anemia secondary to surgery  --stable--hgb has remained stable, pending for today        3) Post operative pain intolerance  --tolerable on current regimen        4) Constipation--expected delayed return of bowel function  --secondary to anesthesia, narcotics, decreased oral intake, and decreased physical activity   --resolved, +BM 3/26      5) N/V  --NPO per primary  --defer to primary           Dispo: continue supportive care          This patient's case and care plan was discussed with the attending surgeon

## 2023-03-27 NOTE — PROGRESS NOTES
CT department called regarding patient order. Patient can eat lunch d/t department scanners down and testing will be done later today.  Bedside RN notified

## 2023-03-27 NOTE — PROGRESS NOTES
Patient family called with many concerns about patient. Specifically, they are concerned the clot in patients tube is obstructing some of the flow. RN ensured chest tube was still patent and reassured the family that there was still output. However they still have concerns about the clot. RN will pass on to CTS in the AM. Family also concerned about patients vomiting/weakness. RN had previously messaged sound physician regarding recurring nausea and vomiting. Physician made pt NPO and said they would assess the issue in the morning. RN told family this. Family believes patient may not want get out of bed due to his chair being uncomfortable so RN will get more comfortable chair. Family told RN that they were thinking about possibly transferring patient to another facility if they are unable to get some answers for patient.

## 2023-03-27 NOTE — PATIENT CARE CONFERENCE
P Quality Flow/Interdisciplinary Rounds Progress Note        Quality Flow Rounds held on March 27, 2023    Disciplines Attending:  Bedside Nurse, , , and Nursing Unit Leadership    Leslie Miller III was admitted on 3/21/2023  2:48 PM    Anticipated Discharge Date:  Expected Discharge Date: 03/23/23    Disposition:    Arash Score:  Arash Scale Score: 19    Readmission Risk              Risk of Unplanned Readmission:  9           Discussed patient goal for the day, patient clinical progression, and barriers to discharge.   The following Goal(s) of the Day/Commitment(s) have been identified:  Activity Progression  increase and monitor chest tube output       Luis Oropeza RN  March 27, 2023

## 2023-03-27 NOTE — PROGRESS NOTES
Hospitalist Progress Note      Synopsis: Patient admitted on 3/21/2023   24-year-old male with no past history of significance. Presented with right lower quadrant pain. Imaging showed possible acute appendicitis and he also had a large loculated left-sided pleural effusion with concern for pneumonia and empyema. Now status post left-sided VATS. Chest tube in place. Subjective  Patient with nausea and emesis over night. Made NPO and CT abdomen ordered. Exam:  BP 89/65   Pulse (!) 110   Temp 97.6 °F (36.4 °C) (Temporal)   Resp 20   Ht 5' 11\" (1.803 m)   Wt 165 lb (74.8 kg)   SpO2 96%   BMI 23.01 kg/m²   General appearance: No apparent distress, appears stated age and cooperative. HEENT: Pupils equal, round, and reactive to light. Conjunctivae/corneas clear. Neck:  Trachea midline. Respiratory: Decreased especially left-sided   cardiovascular: Regular rate and rhythm with normal S1/S2 without murmurs, rubs or gallops. Abdomen: Soft, non-tender, non-distended with normal bowel sounds. Musculoskeletal: No clubbing, cyanosis or edema bilaterally. Brisk capillary refill. 2+ lower extremity pulses (dorsalis pedis).    Skin:  No rashes    Neurologic: awake, alert and following commands     Medications:  Reviewed    Infusion Medications    sodium chloride 150 mL/hr at 03/27/23 1552    sodium chloride      sodium chloride       Scheduled Medications    enoxaparin  40 mg SubCUTAneous Daily    acetaminophen  650 mg Oral Q6H    lactobacillus  1 capsule Oral TID    polyethylene glycol  17 g Oral BID    sennosides-docusate sodium  2 tablet Oral QPM    colchicine  0.6 mg Oral Daily    cefTRIAXone (ROCEPHIN) IV  2,000 mg IntraVENous Q24H    metroNIDAZOLE  500 mg IntraVENous Q8H    sodium chloride flush  10 mL IntraVENous 2 times per day     PRN Meds: perflutren lipid microspheres, oxyCODONE **OR** oxyCODONE, morphine, ipratropium-albuterol, LORazepam, melatonin, bisacodyl, Menthol, sodium chloride, fluid collection could represent empyema or chronic effusion as considerations with atelectatic changes of the remaining left upper lung minimally aerated. Further cardiac imaging may be performed to evaluate for cardiac motion restriction such as echo     CT ABDOMEN PELVIS W IV CONTRAST Additional Contrast? None    Result Date: 3/21/2023  1. Findings above suggest acute appendicitis. 2. Small free fluid in the pelvis. 3. Mild distension of the gallbladder with suspected small pericholecystic fluid versus gallbladder wall thickening. Ultrasound gallbladder could be helpful for further evaluation. 4. Volume loss associated with visualized left lower lung with subpleural effusion of increased attenuation. Clinical correlation recommended. 5. Atelectatic changes are also present in visualized left lung base. 6. Moderate pericardial effusion. XR CHEST PORTABLE    Result Date: 3/25/2023  Stable chest radiograph with opacities throughout left lung related to edema or pneumonia with left pleural effusion. XR CHEST PORTABLE    Result Date: 3/24/2023  Minimally improved aeration of the left lung. Moderate/large loculated left pleural effusion with extensive left lung opacity. Cardiomegaly. XR CHEST PORTABLE    Result Date: 3/23/2023  No interval change from 03/22/2023 exam     XR CHEST PORTABLE    Result Date: 3/22/2023  There is significant pleural thickening and consolidative airspace disease projecting over the entire left hemithorax with only a small segment of aerosolized lung and additional imaging such as CT scan may be of increased sensitivity     IR US CHEST PLEURAL SPACE    Result Date: 3/22/2023  1. Ultrasound localization of the left thorax 2. There is dense pleural thickening of the left thorax with only a small volume of aerated lung near the apex. 3.  No free-flowing pleural effusions could be identified.  RECOMMENDATIONS A CT scan of the thorax for better characterization of the thickened

## 2023-03-27 NOTE — PROGRESS NOTES
Patient had an episode of vomitus. IM tigan given. Surgery and primary messaged. Surgery said they had already signed off. Patient and family are very concerned that patient has been unable to eat and unable to keep anything down for the past few days. Patient took PM pills with applesauce and saltine crackers and so far has been able to keep it down. Primary physician messaged and they said to keep patient NPO until the morning, then the primary team will reevaluate in the morning to see if patient needs additional imaging.

## 2023-03-27 NOTE — CARE COORDINATION
3/27/23  Transition of Care Update. Patient is POD#3 s/p VATS. Patient has no PCP with perfect serve sent to the attending to see if they could request a transfer to [facility] to follow for discharge orders for home care. Patient also re choiced for [address] as [facility] living is unable to accept. Patient and family chose [facility] visiting nurse with referral called and pending. Patient is being followed by ID with orders for IV Flagyl and IV Rocephin pending ID's plan. Patient is for a repeat ECHO today. Patient has a left chest tube and is being followed by CT surgery. Patient is also pending pathology of left chest pleural mass. SW/CM to follow.     Electronically signed by LIDIA Hayward on 3/27/2023 at 4:02 PM

## 2023-03-28 ENCOUNTER — APPOINTMENT (OUTPATIENT)
Dept: CT IMAGING | Age: 23
DRG: 121 | End: 2023-03-28
Payer: MEDICAID

## 2023-03-28 ENCOUNTER — APPOINTMENT (OUTPATIENT)
Dept: GENERAL RADIOLOGY | Age: 23
DRG: 121 | End: 2023-03-28
Payer: MEDICAID

## 2023-03-28 LAB
ACID FAST STN SPEC QL: NORMAL
HIV1+2 AB SERPL QL IA: NORMAL

## 2023-03-28 PROCEDURE — 6370000000 HC RX 637 (ALT 250 FOR IP): Performed by: NURSE PRACTITIONER

## 2023-03-28 PROCEDURE — 2580000003 HC RX 258: Performed by: NURSE PRACTITIONER

## 2023-03-28 PROCEDURE — 74160 CT ABDOMEN W/CONTRAST: CPT

## 2023-03-28 PROCEDURE — 99231 SBSQ HOSP IP/OBS SF/LOW 25: CPT | Performed by: INTERNAL MEDICINE

## 2023-03-28 PROCEDURE — 2580000003 HC RX 258: Performed by: INTERNAL MEDICINE

## 2023-03-28 PROCEDURE — 6360000004 HC RX CONTRAST MEDICATION: Performed by: RADIOLOGY

## 2023-03-28 PROCEDURE — 6360000002 HC RX W HCPCS: Performed by: INTERNAL MEDICINE

## 2023-03-28 PROCEDURE — 97161 PT EVAL LOW COMPLEX 20 MIN: CPT

## 2023-03-28 PROCEDURE — 2500000003 HC RX 250 WO HCPCS: Performed by: NURSE PRACTITIONER

## 2023-03-28 PROCEDURE — 2060000000 HC ICU INTERMEDIATE R&B

## 2023-03-28 PROCEDURE — 6360000002 HC RX W HCPCS: Performed by: NURSE PRACTITIONER

## 2023-03-28 PROCEDURE — 71045 X-RAY EXAM CHEST 1 VIEW: CPT

## 2023-03-28 RX ORDER — SODIUM CHLORIDE 9 MG/ML
INJECTION, SOLUTION INTRAVENOUS CONTINUOUS
Status: ACTIVE | OUTPATIENT
Start: 2023-03-28 | End: 2023-03-29

## 2023-03-28 RX ADMIN — Medication 1 CAPSULE: at 13:37

## 2023-03-28 RX ADMIN — METRONIDAZOLE 500 MG: 500 INJECTION, SOLUTION INTRAVENOUS at 17:43

## 2023-03-28 RX ADMIN — IOPAMIDOL 70 ML: 755 INJECTION, SOLUTION INTRAVENOUS at 09:18

## 2023-03-28 RX ADMIN — ENOXAPARIN SODIUM 40 MG: 100 INJECTION SUBCUTANEOUS at 10:21

## 2023-03-28 RX ADMIN — METRONIDAZOLE 500 MG: 500 INJECTION, SOLUTION INTRAVENOUS at 10:32

## 2023-03-28 RX ADMIN — SODIUM CHLORIDE: 9 INJECTION, SOLUTION INTRAVENOUS at 17:45

## 2023-03-28 RX ADMIN — COLCHICINE 0.6 MG: 0.6 TABLET ORAL at 13:36

## 2023-03-28 RX ADMIN — Medication 1 CAPSULE: at 10:21

## 2023-03-28 RX ADMIN — CEFTRIAXONE SODIUM 2000 MG: 2 INJECTION, POWDER, FOR SOLUTION INTRAMUSCULAR; INTRAVENOUS at 16:30

## 2023-03-28 RX ADMIN — Medication 1 LOZENGE: at 20:51

## 2023-03-28 RX ADMIN — Medication 1 CAPSULE: at 20:51

## 2023-03-28 ASSESSMENT — PAIN DESCRIPTION - LOCATION: LOCATION: ABDOMEN

## 2023-03-28 ASSESSMENT — PAIN SCALES - GENERAL
PAINLEVEL_OUTOF10: 0
PAINLEVEL_OUTOF10: 0
PAINLEVEL_OUTOF10: 1

## 2023-03-28 NOTE — PROGRESS NOTES
Per CTS patient ok to start on diet after CT abd. Primary notified via perfect serve and clear diet order placed. Will continue to advance as tolerated. OK to advance per primary.

## 2023-03-28 NOTE — PROGRESS NOTES
[] Gait Training to improve gait mechanics, endurance and asses need for appropriate assistive device  [] Stair Training in preparation for safe discharge home and/or into the community   [] Positioning to prevent skin breakdown and contractures  [] Safety and Education Training   [] Patient/Caregiver Education   [] HEP  [] Other     Based on pt's current level of functional independence for all mobility, this pt is not a candidate for continued skilled PT services. Will remove pt from PT caseload. Please re-consult if pt experiences functional decline. Thank you. Time in: 0830  Time out: 0845    Total Treatment Time 0 minutes     Evaluation Time includes thorough review of current medical information, gathering information on past medical history/social history and prior level of function, completion of standardized testing/informal observation of tasks, assessment of data and education on plan of care and goals. .    CPT codes:  [x] Low Complexity PT evaluation 55527  [] Moderate Complexity PT evaluation 29114  [] High Complexity PT evaluation 71348  [] PT Re-evaluation 33377  [] Gait training 68589 - minutes  [] Manual therapy 20936 - minutes  [] Therapeutic activities 76936 - minutes  [] Therapeutic exercises 49998 - minutes  [] Neuromuscular reeducation 45690 - minutes     Nancy Beckwith PT, DPT   [id number]

## 2023-03-28 NOTE — PATIENT CARE CONFERENCE
[facility] Quality Flow/Interdisciplinary Rounds Progress Note        Quality Flow Rounds held on March 28, 2023    Disciplines Attending:  Bedside Nurse, , , and Nursing Unit Leadership    Rosalinda Chery III was admitted on 3/21/2023  2:48 PM    Anticipated Discharge Date:  Expected Discharge Date: 03/23/23    Disposition:    Arash Score:  Arash Scale Score: 19    Readmission Risk              Risk of Unplanned Readmission:  9           Discussed patient goal for the day, patient clinical progression, and barriers to discharge.   The following Goal(s) of the Day/Commitment(s) have been identified:  Diagnostics - Report Results      Nicole Staley RN  March 28, 2023

## 2023-03-28 NOTE — PROGRESS NOTES
Pain in range of motion of any joints  Neurological: Following commands, no focal neurodeficit  Lines: peripheral      CBC+dif:  Recent Labs     03/26/23  0544 03/27/23  0800 03/27/23  1725   WBC 11.9* 8.0  --    HGB 14.1 12.8 13.3   HCT 43.3 39.1 40.4   MCV 88.4 87.9  --     289  --      Lab Results   Component Value Date    CRP 4.8 (H) 03/23/2023     No results found for: CRPHS  Lab Results   Component Value Date    SEDRATE 20 (H) 03/23/2023     Lab Results   Component Value Date    ALT 8 03/23/2023    AST 12 03/23/2023    ALKPHOS 121 03/23/2023    BILITOT 0.5 03/23/2023     Lab Results   Component Value Date/Time     03/27/2023 08:00 AM    K 3.7 03/27/2023 08:00 AM    K 4.0 03/23/2023 07:54 AM     03/27/2023 08:00 AM    CO2 26 03/27/2023 08:00 AM    BUN 3 03/27/2023 08:00 AM    CREATININE 0.7 03/27/2023 08:00 AM    LABGLOM >60 03/27/2023 08:00 AM    GLUCOSE 87 03/27/2023 08:00 AM    PROT 7.0 03/23/2023 07:54 AM    LABALBU 3.7 03/23/2023 07:54 AM    CALCIUM 8.1 03/27/2023 08:00 AM    BILITOT 0.5 03/23/2023 07:54 AM    ALKPHOS 121 03/23/2023 07:54 AM    AST 12 03/23/2023 07:54 AM    ALT 8 03/23/2023 07:54 AM       Lab Results   Component Value Date/Time    PROTIME 17.6 03/22/2023 08:53 AM    INR 1.6 03/22/2023 08:53 AM       No results found for: TSH    No results found for: NITRITE, COLORU, PHUR, LABCAST, WBCUA, RBCUA, MUCUS, TRICHOMONAS, YEAST, BACTERIA, CLARITYU, SPECGRAV, LEUKOCYTESUR, UROBILINOGEN, BILIRUBINUR, BLOODU, GLUCOSEU, AMORPHOUS    No results found for: [place], BEART, [id number], PHART, THGBART, BEK7UPF, PO2ART, NRC5ZGH  Radiology:  CT ABDOMEN W CONTRAST Additional Contrast? None   Final Result   Lung bases reveal interval placement of a left large bore chest tube with   heterogeneous opacifications at the left lung base and intermixed gas locules   of likely recent instrumentation on partial imaging with partially imaged   pericardial effusion.       No evidence for mechanical allergy with rash    Plan:    Continue ceftriaxone and Flagyl. Patient developed rash with vancomycin, tried with Benadryl prior to vancomycin administration but patient developed rash on the arm. DC vancomycin  Surgical cultures are no growth so far. HIV negative, Fungitell and serum galactomannan pending  Hepatitis screen pending  Left-sided chest tube has bloody drainage. Follow with CT surgery. ID will continue to follow  Surgical culture and pleural fluid cultures are no growth so far  Chest tube still in place  Waiting for path report   talked to family    Thank you for having us see this patient in consultation. I will be discussing this case with the treating physicians.       Electronically signed by Zainab Gonzalez MD on 3/28/2023 at 3:58 PM

## 2023-03-28 NOTE — PROGRESS NOTES
Patient asked that IV fluids be paused so he could get ready for bed. RN disconnected fluids. After 1 hour, RN reassessed patient and did repeat vitals. Patient BP is 87/44 and asymptomatic. RN reconnected patient to NS @ 150 as the order stated. Physician on call notified of patient pressure. No new orders at this time. Will continue to monitor patient.

## 2023-03-28 NOTE — PROGRESS NOTES
POD#4 Awake, alert. Continues to complain of nausea. NPO per primary service. Denies CP, palpitations, SOB at rest, dizziness/lightheadedness. Vitals:    03/27/23 2315 03/28/23 0015 03/28/23 0200 03/28/23 0415   BP: (!) 87/44 (!) 82/44 95/61 93/60   Pulse: 86   72   Resp: 13   14   Temp: 97.7 °F (36.5 °C)      TempSrc: Oral      SpO2: 96%   92%   Weight:       Height:         O2: none      Intake/Output Summary (Last 24 hours) at 3/28/2023 0801  Last data filed at 3/28/2023 2475  Gross per 24 hour   Intake 0 ml   Output 294 ml   Net -294 ml           UO: Voids without difficulty    CT output:    Pleural: 104mL/8hr (334mL/24hrs)      Recent Labs     03/26/23  0544 03/27/23  0800 03/27/23  1725   WBC 11.9* 8.0  --    HGB 14.1 12.8 13.3   HCT 43.3 39.1 40.4    289  --         Recent Labs     03/26/23  0544 03/27/23  0800   BUN 7 3*   CREATININE 0.6* 0.7           Telemetry: SR      PE  Cardiac: RRR  Lungs: decreased bases  Chest incision with DSD C/D/I. Chest tube x 1 present and secure. Abd: Soft, nontender, +BS  Ext: TABARES              POD#4     A/P:      1) Loculated pleural effusion  --Stable s/p L VATS, drainage of pleural effusion, chest wall biopsy on 3/24/23  --follow biopsy results  --VSS  --tolerating RA  --chest tube without continued output and no airleak. Continue chest tube until improvement in drainage amount- continues to improve.  Continue chest tube today, hopefully be able to remove tomorrow  --Increase activity as tolerated  --Encourage incentive spirometry   --lovenox for dvt prophylaxis        2) Expected acute post operative anemia secondary to surgery  --stable--hgb pending for today        3) Post operative pain intolerance  --tolerable on current regimen        4) Constipation--expected delayed return of bowel function  --secondary to anesthesia, narcotics, decreased oral intake, and decreased physical activity   --resolved, +BM 3/26      5) N/V  --NPO per primary  --CT abd pelvis ordered -- to be completed today            Dispo: continue supportive care          This patient's case and care plan was discussed with the attending surgeon

## 2023-03-28 NOTE — PROGRESS NOTES
Energy Requirements: Current  Energy (kcal/day): 2100-2200  Weight Used for Protein Requirements: Current  Protein (g/day): 1.3-1.5g/kgxCBW=95-115g  Method Used for Fluid Requirements: 1 ml/kcal  Fluid (ml/day): 2100-2200    Nutrition Diagnosis:   Inadequate oral intake related to pain (acute appendicitis) as evidenced by nausea, intake 0-25%, poor intake prior to admission (full liquid diet)    Nutrition Interventions:   Food and/or Nutrient Delivery: Continue Current Diet, Start Oral Nutrition Supplement (ADAT ; start Ensure TID & beatriz BID)  Nutrition Education/Counseling: Education not indicated  Coordination of Nutrition Care: Continue to monitor while inpatient       Goals:     Goals: PO intake 50% or greater, by next RD assessment       Nutrition Monitoring and Evaluation:   Behavioral-Environmental Outcomes: None Identified  Food/Nutrient Intake Outcomes: Food and Nutrient Intake, Supplement Intake, Diet Advancement/Tolerance  Physical Signs/Symptoms Outcomes: Nutrition Focused Physical Findings, Biochemical Data, Chewing or Swallowing, Skin, Weight, GI Status, Fluid Status or Edema    Discharge Planning:     Too soon to determine     Geronimo Ware RD, LD  Contact: [phone]

## 2023-03-28 NOTE — PROGRESS NOTES
PORTABLE   Final Result   1. There continues to be extensive volume loss a left thorax raising the   possibility of loculated fluid or pleural density   2. The right lung appears clear. There are no signs of congestion         XR CHEST PORTABLE   Final Result   Stable chest radiograph with opacities throughout left lung related to edema   or pneumonia with left pleural effusion. XR CHEST PORTABLE   Final Result   Minimally improved aeration of the left lung. Moderate/large loculated left pleural effusion with extensive left lung   opacity. Cardiomegaly. XR CHEST PORTABLE   Final Result   No interval change from 03/22/2023 exam         XR CHEST PORTABLE   Final Result   There is significant pleural thickening and consolidative airspace disease   projecting over the entire left hemithorax with only a small segment of   aerosolized lung and additional imaging such as CT scan may be of increased   sensitivity         IR US CHEST PLEURAL SPACE   Final Result   1. Ultrasound localization of the left thorax      2. There is dense pleural thickening of the left thorax with only a small   volume of aerated lung near the apex. 3.  No free-flowing pleural effusions could be identified. RECOMMENDATIONS   A CT scan of the thorax for better characterization of the thickened pleural   process on the left may be helpful         CT CHEST W CONTRAST   Final Result   Cardiac size nonenlarged with pericardial effusion most notable along the   right atrium margin up to 3 cm thickness. Along the left ventricular free   wall there is pericardial effusion fluid signal which becomes coalescent with   pericardial fat pad edema and a left pleural fluid collection.  Considerable   volume loss of a largely atelectatic and opacified left hemithorax with   heterogeneous signal of a left moderate pleural fluid collection could   represent empyema or chronic effusion as considerations with atelectatic   changes of the remaining left upper lung minimally aerated. Further cardiac   imaging may be performed to evaluate for cardiac motion restriction such as   echo         CT ABDOMEN PELVIS W IV CONTRAST Additional Contrast? None   Final Result   1. Findings above suggest acute appendicitis. 2. Small free fluid in the pelvis. 3. Mild distension of the gallbladder with suspected small pericholecystic   fluid versus gallbladder wall thickening. Ultrasound gallbladder could be   helpful for further evaluation. 4. Volume loss associated with visualized left lower lung with subpleural   effusion of increased attenuation. Clinical correlation recommended. 5. Atelectatic changes are also present in visualized left lung base. 6. Moderate pericardial effusion. XR CHEST PORTABLE    (Results Pending)       Lab Review   Lab Results   Component Value Date/Time     03/27/2023 08:00 AM    K 3.7 03/27/2023 08:00 AM    K 4.0 03/23/2023 07:54 AM     03/27/2023 08:00 AM    CO2 26 03/27/2023 08:00 AM    BUN 3 03/27/2023 08:00 AM    CREATININE 0.7 03/27/2023 08:00 AM    GLUCOSE 87 03/27/2023 08:00 AM    CALCIUM 8.1 03/27/2023 08:00 AM     Lab Results   Component Value Date/Time    WBC 8.0 03/27/2023 08:00 AM    HGB 13.3 03/27/2023 05:25 PM    HCT 40.4 03/27/2023 05:25 PM    MCV 87.9 03/27/2023 08:00 AM     03/27/2023 08:00 AM     I have personally reviewed the laboratory, cardiac diagnostic and radiographic testing as outlined above:    Assessment:     1. Pericardial effusion: Moderate, stable compared to an echocardiogram done on the 23rd, will continue current treatment  2. Loculated pleural effusion: S/p VATS and chest tube placement, as per CT surgery  3. Hypotension: Improved with IV hydration    Recommendations:     1.   Continue current treatment    No active general cardiology problems, will see as needed, thank you    Discussed with patient      Electronically signed by Aleisha Fernandez MD on

## 2023-03-28 NOTE — PROGRESS NOTES
Hospitalist Progress Note      Synopsis: Patient admitted on 3/21/2023   24-year-old male with no past history of significance. Presented with right lower quadrant pain. Imaging showed possible acute appendicitis and he also had a large loculated left-sided pleural effusion with concern for pneumonia and empyema. Now status post left-sided VATS. Chest tube in place with CTS following. Patient developed nausea and vomiting on 3/27. CT abdomen is pending. ECHO was noted to have a pericardial effusion for which cardiology is following. Repeat ECHO done on 3/27 showing moderate pericardial effusion which is stable in size from initial ECHO. Subjective  Patient reports nausea has improved somewhat but still there. He denies any abdominal pain. Exam:  /67   Pulse 79   Temp 98.1 °F (36.7 °C) (Temporal)   Resp 18   Ht 5' 11\" (1.803 m)   Wt 165 lb (74.8 kg)   SpO2 96%   BMI 23.01 kg/m²   General appearance: No apparent distress, appears stated age and cooperative. HEENT: Pupils equal, round, and reactive to light. Conjunctivae/corneas clear. Neck:  Trachea midline. Respiratory: Decreased especially left-sided   cardiovascular: Regular rate and rhythm with normal S1/S2 without murmurs, rubs or gallops. Abdomen: Soft, non-tender, non-distended with normal bowel sounds. Musculoskeletal: No clubbing, cyanosis or edema bilaterally. Brisk capillary refill. 2+ lower extremity pulses (dorsalis pedis).    Skin:  No rashes    Neurologic: awake, alert and following commands     Medications:  Reviewed    Infusion Medications    sodium chloride 150 mL/hr at 03/28/23 0356    sodium chloride      sodium chloride       Scheduled Medications    enoxaparin  40 mg SubCUTAneous Daily    acetaminophen  650 mg Oral Q6H    lactobacillus  1 capsule Oral TID    polyethylene glycol  17 g Oral BID    sennosides-docusate sodium  2 tablet Oral QPM    colchicine  0.6 mg Oral Daily    cefTRIAXone (ROCEPHIN) IV  2,000 mg thorax with only a small volume of aerated lung near the apex. 3.  No free-flowing pleural effusions could be identified. RECOMMENDATIONS A CT scan of the thorax for better characterization of the thickened pleural process on the left may be helpful       ASSESSMENT:    Principal Problem:    Acute appendicitis  Active Problems:    Loculated pleural effusion    Vancomycin adverse reaction    Pericardial effusion    Ex-smoker    ADHD    Current every day vaping    H/o Lyme disease    Hypotension  Resolved Problems:    * No resolved hospital problems. *       PLAN:    1 no abdominal complaints and initial evaluation for acute appendicitis did not indicate by surgery that he needed a surgical procedure. 2.  Left-sided effusion now status post VATS procedure. Thoracentesis was initially attempted but fluid was too thick. Serologies in place for autoimmune and immunosuppression. 3.  Discomfort from the VATS procedure seems to be under control  4. Some degree of pericardial effusion however no tamponade. Repeat ECHO showing stable effusion. Appreciate cardiology input   5. Pathology of tissue awaited from left chest mass  6. Repeat CT abdomen pending due to nausea and emesis on 3/27      Diet: Diet NPO Exceptions are: Sips of Water with Meds  Code Status: Full Code  PT/OT Eval Status:   Order  DVT Prophylaxis:   In place  Recommended disposition at discharge: Likely home    +++++++++++++++++++++++++++++++++++++++++++++++++  Chelsea Adler MD   [facility].  +++++++++++++++++++++++++++++++++++++++++++++++++  NOTE: This report was transcribed using voice recognition software. Every effort was made to ensure accuracy; however, inadvertent computerized transcription errors may be present.

## 2023-03-28 NOTE — CARE COORDINATION
Transition of care update. POD #4,  s/p L VATS. Patient is on IV Rocephin and Flagyl. ID is following. A repeat CT of the abdomen ordered due to n/v.. ECHO was noted to have a pericardial effusion for which cardiology is following. Repeat ECHO done on 3/27 showing moderate pericardial effusion which is stable in size from initial ECHO. Patient's discharge plan is to return home with his girlfriend and Babak Mono. Pending acceptance from [address]. Clinicals faxed to [facility] per her request. New PCP appointment set up for Dr Eugenia Sierra in [place] for Friday March 31st at 2 pm. Attending on case stated House medicine would not cover [facility] orders. She stated there is a residency clinic if needed to establish a PCP sooner. CM/SW will continue to follow. Electronically signed by Kenia Mejía RN on 3/28/2023 at 11:31 -[phone]    Addendum [phone]: Lobo Wilson at [address] in [place], they can hold a spot for him for Friday, and possibly Sat, but can't officially put him on their schedule until he sees the new PCP at his appointment on Friday. Cultures still pending per ID note. RN updated regarding this.  MB

## 2023-03-29 ENCOUNTER — APPOINTMENT (OUTPATIENT)
Dept: GENERAL RADIOLOGY | Age: 23
DRG: 121 | End: 2023-03-29
Payer: MEDICAID

## 2023-03-29 LAB
ANION GAP SERPL CALCULATED.3IONS-SCNC: 5 MMOL/L (ref 7–16)
BACTERIA SPEC ANAEROBE CULT: NORMAL
BUN SERPL-MCNC: 2 MG/DL (ref 6–20)
CALCIUM SERPL-MCNC: 7.9 MG/DL (ref 8.6–10.2)
CHLORIDE SERPL-SCNC: 106 MMOL/L (ref 98–107)
CO2 SERPL-SCNC: 27 MMOL/L (ref 22–29)
CREAT SERPL-MCNC: 0.7 MG/DL (ref 0.7–1.2)
ERYTHROCYTE [DISTWIDTH] IN BLOOD BY AUTOMATED COUNT: 14 FL (ref 11.5–15)
GLUCOSE SERPL-MCNC: 92 MG/DL (ref 74–99)
HCT VFR BLD AUTO: 34.3 % (ref 37–54)
HGB BLD-MCNC: 10.9 G/DL (ref 12.5–16.5)
LOEFFLER MB STN SPEC: NORMAL
MCH RBC QN AUTO: 28.3 PG (ref 26–35)
MCHC RBC AUTO-ENTMCNC: 31.8 % (ref 32–34.5)
MCV RBC AUTO: 89.1 FL (ref 80–99.9)
PLATELET # BLD AUTO: 258 E9/L (ref 130–450)
PMV BLD AUTO: 9 FL (ref 7–12)
POTASSIUM SERPL-SCNC: 3.6 MMOL/L (ref 3.5–5)
RBC # BLD AUTO: 3.85 E12/L (ref 3.8–5.8)
SODIUM SERPL-SCNC: 138 MMOL/L (ref 132–146)
WBC # BLD: 5.4 E9/L (ref 4.5–11.5)

## 2023-03-29 PROCEDURE — 36415 COLL VENOUS BLD VENIPUNCTURE: CPT

## 2023-03-29 PROCEDURE — 2060000000 HC ICU INTERMEDIATE R&B

## 2023-03-29 PROCEDURE — 6370000000 HC RX 637 (ALT 250 FOR IP): Performed by: NURSE PRACTITIONER

## 2023-03-29 PROCEDURE — 85027 COMPLETE CBC AUTOMATED: CPT

## 2023-03-29 PROCEDURE — 2580000003 HC RX 258: Performed by: NURSE PRACTITIONER

## 2023-03-29 PROCEDURE — 80048 BASIC METABOLIC PNL TOTAL CA: CPT

## 2023-03-29 PROCEDURE — 6360000002 HC RX W HCPCS: Performed by: INTERNAL MEDICINE

## 2023-03-29 PROCEDURE — 6360000002 HC RX W HCPCS: Performed by: NURSE PRACTITIONER

## 2023-03-29 PROCEDURE — 2500000003 HC RX 250 WO HCPCS: Performed by: NURSE PRACTITIONER

## 2023-03-29 PROCEDURE — 97165 OT EVAL LOW COMPLEX 30 MIN: CPT

## 2023-03-29 PROCEDURE — 71045 X-RAY EXAM CHEST 1 VIEW: CPT

## 2023-03-29 PROCEDURE — 82784 ASSAY IGA/IGD/IGG/IGM EACH: CPT

## 2023-03-29 PROCEDURE — 82164 ANGIOTENSIN I ENZYME TEST: CPT

## 2023-03-29 PROCEDURE — 82787 IGG 1 2 3 OR 4 EACH: CPT

## 2023-03-29 PROCEDURE — 83520 IMMUNOASSAY QUANT NOS NONAB: CPT

## 2023-03-29 RX ORDER — LIDOCAINE HYDROCHLORIDE 10 MG/ML
5 INJECTION, SOLUTION EPIDURAL; INFILTRATION; INTRACAUDAL; PERINEURAL ONCE
Status: DISCONTINUED | OUTPATIENT
Start: 2023-03-29 | End: 2023-03-31 | Stop reason: HOSPADM

## 2023-03-29 RX ORDER — SODIUM CHLORIDE 9 MG/ML
INJECTION, SOLUTION INTRAVENOUS PRN
Status: DISCONTINUED | OUTPATIENT
Start: 2023-03-29 | End: 2023-03-30 | Stop reason: SDUPTHER

## 2023-03-29 RX ORDER — HEPARIN SODIUM (PORCINE) LOCK FLUSH IV SOLN 100 UNIT/ML 100 UNIT/ML
3 SOLUTION INTRAVENOUS PRN
Status: DISCONTINUED | OUTPATIENT
Start: 2023-03-29 | End: 2023-03-31 | Stop reason: HOSPADM

## 2023-03-29 RX ORDER — SODIUM CHLORIDE 0.9 % (FLUSH) 0.9 %
5-40 SYRINGE (ML) INJECTION EVERY 12 HOURS SCHEDULED
Status: DISCONTINUED | OUTPATIENT
Start: 2023-03-29 | End: 2023-03-31 | Stop reason: HOSPADM

## 2023-03-29 RX ORDER — SODIUM CHLORIDE 0.9 % (FLUSH) 0.9 %
5-40 SYRINGE (ML) INJECTION PRN
Status: DISCONTINUED | OUTPATIENT
Start: 2023-03-29 | End: 2023-03-31 | Stop reason: HOSPADM

## 2023-03-29 RX ORDER — HEPARIN SODIUM (PORCINE) LOCK FLUSH IV SOLN 100 UNIT/ML 100 UNIT/ML
3 SOLUTION INTRAVENOUS EVERY 12 HOURS SCHEDULED
Status: DISCONTINUED | OUTPATIENT
Start: 2023-03-29 | End: 2023-03-31 | Stop reason: HOSPADM

## 2023-03-29 RX ADMIN — SODIUM CHLORIDE, PRESERVATIVE FREE 10 ML: 5 INJECTION INTRAVENOUS at 22:18

## 2023-03-29 RX ADMIN — COLCHICINE 0.6 MG: 0.6 TABLET ORAL at 08:38

## 2023-03-29 RX ADMIN — Medication 1 CAPSULE: at 22:17

## 2023-03-29 RX ADMIN — METRONIDAZOLE 500 MG: 500 INJECTION, SOLUTION INTRAVENOUS at 18:50

## 2023-03-29 RX ADMIN — Medication 1 CAPSULE: at 13:47

## 2023-03-29 RX ADMIN — METRONIDAZOLE 500 MG: 500 INJECTION, SOLUTION INTRAVENOUS at 11:08

## 2023-03-29 RX ADMIN — ACETAMINOPHEN 650 MG: 325 TABLET ORAL at 22:17

## 2023-03-29 RX ADMIN — CEFTRIAXONE SODIUM 2000 MG: 2 INJECTION, POWDER, FOR SOLUTION INTRAMUSCULAR; INTRAVENOUS at 16:42

## 2023-03-29 RX ADMIN — ENOXAPARIN SODIUM 40 MG: 100 INJECTION SUBCUTANEOUS at 08:38

## 2023-03-29 RX ADMIN — METRONIDAZOLE 500 MG: 500 INJECTION, SOLUTION INTRAVENOUS at 03:23

## 2023-03-29 RX ADMIN — ACETAMINOPHEN 650 MG: 325 TABLET ORAL at 08:39

## 2023-03-29 RX ADMIN — Medication 1 CAPSULE: at 08:39

## 2023-03-29 ASSESSMENT — PAIN SCALES - GENERAL
PAINLEVEL_OUTOF10: 0

## 2023-03-29 NOTE — PROGRESS NOTES
cyanosis, no bilateral lower extremity edema. Brisk capillary refill. Skin:  No rashes  on visible skin  Neurologic: awake, alert and following commands     ASSESSMENT:     Principal Problem:    Acute appendicitis, treated with antibiotics, no surgical intervention planned. Active Problems:    Loculated pleural effusion    Vancomycin adverse reaction    Pericardial effusion    Ex-smoker    ADHD    Current every day vaping    H/o Lyme disease    Hypotension        PLAN:     1 no abdominal complaints and initial evaluation for acute appendicitis did not indicate by surgery that he needed a surgical procedure. 2.  Left-sided effusion now status post VATS procedure. Thoracentesis was initially attempted but fluid was too thick. Serologies in place for autoimmune and immunosuppression. 3.  Discomfort from the VATS procedure seems to be under control  4. Some degree of pericardial effusion however no tamponade. Repeat ECHO showing stable effusion. Appreciate cardiology input   5. Pathology of tissue awaited from left chest mass  6. Repeat CT abdomen pending due to nausea and emesis on 3/28, did not show any evidence of mechanical obstructive process of bowel or inflammatory finding of the bowel, some stool burden noted      DISPOSITION:   Clear discharge disposition, waiting for final ID recommendations and chest tube needs to be removed before discharge.     Medications:  REVIEWED DAILY    Infusion Medications    sodium chloride      sodium chloride       Scheduled Medications    enoxaparin  40 mg SubCUTAneous Daily    acetaminophen  650 mg Oral Q6H    lactobacillus  1 capsule Oral TID    polyethylene glycol  17 g Oral BID    sennosides-docusate sodium  2 tablet Oral QPM    colchicine  0.6 mg Oral Daily    cefTRIAXone (ROCEPHIN) IV  2,000 mg IntraVENous Q24H    metroNIDAZOLE  500 mg IntraVENous Q8H    sodium chloride flush  10 mL IntraVENous 2 times per day     PRN Meds: perflutren lipid microspheres, oxyCODONE **OR** oxyCODONE, morphine, ipratropium-albuterol, LORazepam, melatonin, bisacodyl, Menthol, sodium chloride, trimethobenzamide, diphenhydrAMINE, artificial tears, sodium chloride, sodium chloride flush, sodium chloride, ondansetron    Labs:     Recent Labs     03/27/23  0800 03/27/23  1725 03/29/23  0500   WBC 8.0  --  5.4   HGB 12.8 13.3 10.9*   HCT 39.1 40.4 34.3*     --  258       Recent Labs     03/27/23  0800 03/29/23  0500    138   K 3.7 3.6    106   CO2 26 27   BUN 3* 2*   CREATININE 0.7 0.7   CALCIUM 8.1* 7.9*       No results for input(s): PROT, ALB, ALKPHOS, ALT, AST, BILITOT, AMYLASE, LIPASE in the last 72 hours. No results for input(s): INR in the last 72 hours. No results for input(s): Kaleen Hope in the last 72 hours. Chronic labs:    Lab Results   Component Value Date    INR 1.6 03/22/2023       Radiology: REVIEWED DAILY    +++++++++++++++++++++++++++++++++++++++++++++++++  Danay Rico MD  [facility] - [address] [place] [address], [facility]  +++++++++++++++++++++++++++++++++++++++++++++++++  NOTE: This report was transcribed using voice recognition software. Every effort was made to ensure accuracy; however, inadvertent computerized transcription errors may be present.

## 2023-03-29 NOTE — PROGRESS NOTES
Independent   Seated- pt observed to be self-groomed. UB Dressing Independent      LB Dressing Independent   Completed in standing to don pants with good balance and no LOB. Bathing Independent     Toileting Independent      Bed Mobility  Supine to sit: Independent   Sit to supine: Independent   Functional Transfers Sit to stand: Independent   Stand to sit: Independent   Stand pivot: Independent    Functional Mobility Indep for few steps with no AD- no balance impairment noted- pt requesting to wait to walk until IV removed. Balance Sitting:     Static:  wfl    Dynamic:wfl  Standing: wfl   Activity Tolerance wfl   Visual/  Perceptual Glasses: none present            UE ROM: RUE: WFL  LUE: WFL  Strength: RUE: grossly 5/5 LUE: grossly 5/5   Strength: B WFL  Fine Motor Coordination: B WFL    Hearing: WFL  Sensation:  No c/o numbness or tingling  Tone:  WFL  Edema: None noted                            Comments/Treatment: Cleared by RN to see patient. Upon arrival, patient semi supine in bed. Pt demonstrating independence with ADLs/mobility and good understanding of education and safety techniques. At end of session, patient semi supine in bed with call light and phone within reach, all lines and tubes intact. Recommend d/c from OT d/t no acute skilled needs at this time. Evaluation time includes thorough review of current medical information, gathering information on past medical & social history & PLOF, completion of standardized testing, informal observation of tasks, consultation with other medical professions/disciplines, assessment of data & development of POC/goals.      Evaluation Complexity: Low    Time In: 1050  Time Out: 1105      Min Units   OT Eval Low 29698  x     OT Eval Medium 74725      OT Eval High [id number]       OT Re-Eval [id number]       Therapeutic Ex [id number]       Therapeutic Activities 59444       ADL/Self Care 64519       Orthotic Management [address]       Neuro Re-Ed 01499 Non-Billable Time           [facility] Floor, OTR/L, [id number]

## 2023-03-29 NOTE — PATIENT CARE CONFERENCE
P Quality Flow/Interdisciplinary Rounds Progress Note        Quality Flow Rounds held on March 29, 2023    Disciplines Attending:  Bedside Nurse, , , and Nursing Unit Leadership    Kaycee Franco III was admitted on 3/21/2023  2:48 PM    Anticipated Discharge Date:  Expected Discharge Date: 03/23/23    Disposition:    Arash Score:  Arash Scale Score: 20    Readmission Risk              Risk of Unplanned Readmission:  9           Discussed patient goal for the day, patient clinical progression, and barriers to discharge.   The following Goal(s) of the Day/Commitment(s) have been identified:  ambulate/discharge planning       Xiomara Summers RN  March 29, 2023

## 2023-03-29 NOTE — PROGRESS NOTES
effusion most notable along the   right atrium margin up to 3 cm thickness. Along the left ventricular free   wall there is pericardial effusion fluid signal which becomes coalescent with   pericardial fat pad edema and a left pleural fluid collection. Considerable   volume loss of a largely atelectatic and opacified left hemithorax with   heterogeneous signal of a left moderate pleural fluid collection could   represent empyema or chronic effusion as considerations with atelectatic   changes of the remaining left upper lung minimally aerated. Further cardiac   imaging may be performed to evaluate for cardiac motion restriction such as   echo         CT ABDOMEN PELVIS W IV CONTRAST Additional Contrast? None   Final Result   1. Findings above suggest acute appendicitis. 2. Small free fluid in the pelvis. 3. Mild distension of the gallbladder with suspected small pericholecystic   fluid versus gallbladder wall thickening. Ultrasound gallbladder could be   helpful for further evaluation. 4. Volume loss associated with visualized left lower lung with subpleural   effusion of increased attenuation. Clinical correlation recommended. 5. Atelectatic changes are also present in visualized left lung base. 6. Moderate pericardial effusion. XR CHEST PORTABLE    (Results Pending)   XR CHEST PORTABLE    (Results Pending)       Microbiology:  Pending  No results for input(s): BC in the last 72 hours. No results for input(s): ORG in the last 72 hours. No results for input(s): [place] in the last 72 hours. No results for input(s): STREPNEUMAGU in the last 72 hours. No results for input(s): LP1UAG in the last 72 hours. No results for input(s): ASO in the last 72 hours. No results for input(s): CULTRESP in the last 72 hours.     Assessment:  Large left-sided loculated pleural effusion  Community-acquired pneumonia  Acute appendicitis  Pericardial effusion without tamponade  Status post left-sided VATS with pleural biopsy and pleural fluid drainage  Vancomycin allergy with rash    Plan:    Continue ceftriaxone and Flagyl. Patient developed rash with vancomycin, tried with Benadryl prior to vancomycin administration but patient developed rash on the arm. DC vancomycin  Surgical cultures are no growth so far. HIV negative, Fungitell and serum galactomannan pending  Hepatitis screen pending  Left-sided chest tube has bloody drainage. Follow with CT surgery. ID will continue to follow  Surgical culture and pleural fluid cultures are no growth so far  Chest tube still in place  Waiting for path report   talked to family  Surg path no malignancy  Cytol neg for malignancy  Picc line today    Thank you for having us see this patient in consultation. I will be discussing this case with the treating physicians.       Electronically signed by Zainab Gonzalez MD on 3/29/2023 at 8:23 AM

## 2023-03-29 NOTE — PLAN OF CARE
Problem: Discharge Planning  Goal: Discharge to home or other facility with appropriate resources  3/29/2023 0945 by Drew Sandifer, RN  Outcome: [place]  3/29/2023 0943 by Drew Sandifer, RN  Outcome: Progressing     Problem: ABCDS Injury Assessment  Goal: Absence of physical injury  Outcome: Progressing     Problem: Pain  Goal: Verbalizes/displays adequate comfort level or baseline comfort level  Outcome: Progressing     Problem: Skin/Tissue Integrity - Adult  Goal: Skin integrity remains intact  Outcome: Progressing     Problem: Musculoskeletal - Adult  Goal: Return mobility to safest level of function  Outcome: Progressing

## 2023-03-29 NOTE — CARE COORDINATION
3/29/23  Transition of Care update. Patient is POD#5 s/p L VATS. Patient has a chest tube in place and being followed by CTS. Patient continues on IV Flagyl and IV Rocephin with plan for PICC line placement today. Patient is being followed by [facility] Nurse with call placed to [facility] Nurse to update on discharge needs and awaiting a return call. Patient does not have a PCP with an appointment set up with Dr Alvie Gosselin in [place] for Friday March 31st at 2 pm. Attending on case stated House medicine would not [facility]. Patient will likely need to discharge Friday establish with PCP and have home care admit after seen. Waiting final ID ATB orders to confirm [facility] can accept. KARLEY/CM to follow. Electronically signed by LIDIA Tate on 3/29/2023 at 11:45 AM     2:15pm  [facility] voicing they are not willing to follow without patient seeing an established PCP in the community. They will not accept even with a house physician following until PCP is established. Call placed to [facility] to see is they will follow with Dr. Yo Mcclendon following for IV ATB until patient can see PCP Dr Alvie Gosselin in [place] on Friday March 31st at 2 pm. Message left on voice mail and awaiting return call. Call also placed to [facility] who is not in network with Patients insurance.   KARLEY/Cm to follow    Electronically signed by LIDIA Tate on 3/29/2023 at 2:21 PM

## 2023-03-29 NOTE — CONSULTS
personally contacted radiology talked with Dr. Marilee Juarez. He agreed that the patient had a abnormal spleen with innumerable hypodense lesions differential diagnosis includes atypical infections, sarcoidosis, lymphoma    Plan:  Had a very long discussion with patient. Cytology for his pleural effusion is negative for malignancy. 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 There was no evidence of neoplasm or malignancy. Differential diagnosis at this point includes asbestos exposure although given this patient's age and history this seems less likely. Another differential diagnosis can include primary benign or malignant pleural tumors. These may include solitary fibroid tumor, calcifying fibrous tumor, primary pleural lymphoma. At this time I will go ahead and check his IgG4  level, also IgG subclasses I will also like to check his ACE and IL-2 receptor levels. I will be communicating with pathology to consider sending his slides for second opinion. Patient's chest x-ray does not show any significant improvement would like to proceed with obtaining a postoperative CT scan without contrast to make sure there are no intraparenchymal masses or lesions. Regarding the spleen we will discuss further infectious disease for further work-up. Thank you for allowing me to participate in the care of [facility]. Please feel free to call with questions. Total time spending with patient and reviewing all his images and discussions was over 50 minutes. Electronically signed by Elizabeth Hidalgo MD on 3/29/2023 at 5:43 PM      Note: This report was completed utilizing computer voice recognition software.  Every effort has been made to ensure accuracy, however; inadvertent computerized transcription errors may be present

## 2023-03-29 NOTE — PROGRESS NOTES
POD#5  Awake, alert. No complaints. Denies CP, palpitations, SOB at rest, dizziness/lightheadedness. Vitals:    03/28/23 2045 03/28/23 2300 03/29/23 0000 03/29/23 0315   BP: (!) 99/53 (!) 88/44 (!) 96/58 98/62   Pulse: 75 63  66   Resp: 16 16  16   Temp: 98.2 °F (36.8 °C) 98.4 °F (36.9 °C)  97.5 °F (36.4 °C)   TempSrc: Temporal Temporal  Temporal   SpO2: 98% 96%  93%   Weight:       Height:         O2: None       Intake/Output Summary (Last 24 hours) at 3/29/2023 0747  Last data filed at 3/29/2023 0701  Gross per 24 hour   Intake 300 ml   Output 574 ml   Net -274 ml         CT output:  Pleural: 70mL/8hr (570mL/24hrs)      Recent Labs     03/27/23  0800 03/27/23  1725 03/29/23  0500   WBC 8.0  --  5.4   HGB 12.8 13.3 10.9*   HCT 39.1 40.4 34.3*     --  258      Recent Labs     03/27/23  0800 03/29/23  0500   BUN 3* 2*   CREATININE 0.7 0.7         Telemetry: SR      PE  Cardiac: RRR  Lungs: decreased bases  Chest incision with DSD C/D/I. Chest tube x 1 present and secure. Abd: Soft, nontender, +BS  Ext: TABARES         A/P: POD#5    1) Loculated pleural effusion  --Stable s/p L VATS, drainage of pleural effusion, chest wall biopsy on 3/24/23  --follow biopsy results  --VSS  --tolerating RA  --chest tube without continued output and no airleak. Continue chest tube until improvement in drainage amount- continues to improve.  Continue chest tube today, hopefully be able to remove soon   --Increase activity as tolerated  --Encourage incentive spirometry   --lovenox for dvt prophylaxis        2) Expected acute post operative anemia secondary to surgery  --stable--hgb  today 10.9         3) Post operative pain intolerance  --tolerable on current regimen        4) Constipation--expected delayed return of bowel function  --secondary to anesthesia, narcotics, decreased oral intake, and decreased physical activity   --resolved, +BM 3/28        5) N/V  -- improved   --CT abd pelvis yest- per primary and Gen surgery services            Dispo: continue supportive care           This patient's case and care plan was discussed with the attending surgeon

## 2023-03-30 ENCOUNTER — APPOINTMENT (OUTPATIENT)
Dept: GENERAL RADIOLOGY | Age: 23
DRG: 121 | End: 2023-03-30
Payer: MEDICAID

## 2023-03-30 ENCOUNTER — APPOINTMENT (OUTPATIENT)
Dept: CT IMAGING | Age: 23
DRG: 121 | End: 2023-03-30
Payer: MEDICAID

## 2023-03-30 LAB
ANION GAP SERPL CALCULATED.3IONS-SCNC: 8 MMOL/L (ref 7–16)
BUN SERPL-MCNC: 4 MG/DL (ref 6–20)
CALCIUM SERPL-MCNC: 8.2 MG/DL (ref 8.6–10.2)
CHLORIDE SERPL-SCNC: 106 MMOL/L (ref 98–107)
CO2 SERPL-SCNC: 25 MMOL/L (ref 22–29)
CREAT SERPL-MCNC: 0.6 MG/DL (ref 0.7–1.2)
ERYTHROCYTE [DISTWIDTH] IN BLOOD BY AUTOMATED COUNT: 14.2 FL (ref 11.5–15)
FLUID TYPE: NORMAL
FLUID TYPE: NORMAL
GLUCOSE FLD-MCNC: 95 MG/DL
GLUCOSE FLD-MCNC: 99 MG/DL
GLUCOSE SERPL-MCNC: 88 MG/DL (ref 74–99)
HCT VFR BLD AUTO: 38.9 % (ref 37–54)
HGB BLD-MCNC: 12.6 G/DL (ref 12.5–16.5)
IGA SERPL-MCNC: 136 MG/DL (ref 70–400)
IGG SERPL-MCNC: 863 MG/DL (ref 700–1600)
IGM SERPL-MCNC: 161 MG/DL (ref 40–230)
Lab: NORMAL
MCH RBC QN AUTO: 28.8 PG (ref 26–35)
MCHC RBC AUTO-ENTMCNC: 32.4 % (ref 32–34.5)
MCV RBC AUTO: 88.8 FL (ref 80–99.9)
PLATELET # BLD AUTO: 321 E9/L (ref 130–450)
PMV BLD AUTO: 8.6 FL (ref 7–12)
POTASSIUM SERPL-SCNC: 3.7 MMOL/L (ref 3.5–5)
PROT FLD-MCNC: 2.6 G/DL
PROT FLD-MCNC: 2.7 G/DL
RBC # BLD AUTO: 4.38 E12/L (ref 3.8–5.8)
SODIUM SERPL-SCNC: 139 MMOL/L (ref 132–146)
THIS TEST SENT TO: NORMAL
WBC # BLD: 5.2 E9/L (ref 4.5–11.5)

## 2023-03-30 PROCEDURE — C1751 CATH, INF, PER/CENT/MIDLINE: HCPCS

## 2023-03-30 PROCEDURE — 6370000000 HC RX 637 (ALT 250 FOR IP): Performed by: NURSE PRACTITIONER

## 2023-03-30 PROCEDURE — 76937 US GUIDE VASCULAR ACCESS: CPT

## 2023-03-30 PROCEDURE — 84157 ASSAY OF PROTEIN OTHER: CPT

## 2023-03-30 PROCEDURE — 6360000002 HC RX W HCPCS: Performed by: INTERNAL MEDICINE

## 2023-03-30 PROCEDURE — 36415 COLL VENOUS BLD VENIPUNCTURE: CPT

## 2023-03-30 PROCEDURE — 2580000003 HC RX 258: Performed by: NURSE PRACTITIONER

## 2023-03-30 PROCEDURE — 83986 ASSAY PH BODY FLUID NOS: CPT

## 2023-03-30 PROCEDURE — 82947 ASSAY GLUCOSE BLOOD QUANT: CPT

## 2023-03-30 PROCEDURE — 88305 TISSUE EXAM BY PATHOLOGIST: CPT

## 2023-03-30 PROCEDURE — 6360000002 HC RX W HCPCS: Performed by: NURSE PRACTITIONER

## 2023-03-30 PROCEDURE — 87070 CULTURE OTHR SPECIMN AEROBIC: CPT

## 2023-03-30 PROCEDURE — 80048 BASIC METABOLIC PNL TOTAL CA: CPT

## 2023-03-30 PROCEDURE — 87471 BARTONELLA DNA AMP PROBE: CPT

## 2023-03-30 PROCEDURE — 85027 COMPLETE CBC AUTOMATED: CPT

## 2023-03-30 PROCEDURE — 2060000000 HC ICU INTERMEDIATE R&B

## 2023-03-30 PROCEDURE — 2500000003 HC RX 250 WO HCPCS: Performed by: NURSE PRACTITIONER

## 2023-03-30 PROCEDURE — 71250 CT THORAX DX C-: CPT

## 2023-03-30 PROCEDURE — 71045 X-RAY EXAM CHEST 1 VIEW: CPT

## 2023-03-30 PROCEDURE — 36569 INSJ PICC 5 YR+ W/O IMAGING: CPT

## 2023-03-30 PROCEDURE — 2580000003 HC RX 258: Performed by: INTERNAL MEDICINE

## 2023-03-30 PROCEDURE — 87205 SMEAR GRAM STAIN: CPT

## 2023-03-30 PROCEDURE — 88112 CYTOPATH CELL ENHANCE TECH: CPT

## 2023-03-30 RX ORDER — HEPARIN SODIUM (PORCINE) LOCK FLUSH IV SOLN 100 UNIT/ML 100 UNIT/ML
3 SOLUTION INTRAVENOUS EVERY 12 HOURS SCHEDULED
Status: DISCONTINUED | OUTPATIENT
Start: 2023-03-30 | End: 2023-03-31 | Stop reason: HOSPADM

## 2023-03-30 RX ORDER — LIDOCAINE HYDROCHLORIDE 10 MG/ML
5 INJECTION, SOLUTION EPIDURAL; INFILTRATION; INTRACAUDAL; PERINEURAL ONCE
Status: DISCONTINUED | OUTPATIENT
Start: 2023-03-30 | End: 2023-03-31 | Stop reason: HOSPADM

## 2023-03-30 RX ORDER — SODIUM CHLORIDE 0.9 % (FLUSH) 0.9 %
5-40 SYRINGE (ML) INJECTION PRN
Status: DISCONTINUED | OUTPATIENT
Start: 2023-03-30 | End: 2023-03-31 | Stop reason: HOSPADM

## 2023-03-30 RX ORDER — SODIUM CHLORIDE 9 MG/ML
INJECTION, SOLUTION INTRAVENOUS PRN
Status: DISCONTINUED | OUTPATIENT
Start: 2023-03-30 | End: 2023-03-31 | Stop reason: HOSPADM

## 2023-03-30 RX ORDER — SODIUM CHLORIDE 0.9 % (FLUSH) 0.9 %
5-40 SYRINGE (ML) INJECTION EVERY 12 HOURS SCHEDULED
Status: DISCONTINUED | OUTPATIENT
Start: 2023-03-30 | End: 2023-03-31 | Stop reason: HOSPADM

## 2023-03-30 RX ORDER — HEPARIN SODIUM (PORCINE) LOCK FLUSH IV SOLN 100 UNIT/ML 100 UNIT/ML
3 SOLUTION INTRAVENOUS PRN
Status: DISCONTINUED | OUTPATIENT
Start: 2023-03-30 | End: 2023-03-31 | Stop reason: HOSPADM

## 2023-03-30 RX ADMIN — SODIUM CHLORIDE, PRESERVATIVE FREE 10 ML: 5 INJECTION INTRAVENOUS at 09:19

## 2023-03-30 RX ADMIN — COLCHICINE 0.6 MG: 0.6 TABLET ORAL at 09:19

## 2023-03-30 RX ADMIN — Medication 1 CAPSULE: at 20:51

## 2023-03-30 RX ADMIN — SODIUM CHLORIDE, PRESERVATIVE FREE 10 ML: 5 INJECTION INTRAVENOUS at 20:52

## 2023-03-30 RX ADMIN — Medication 1 CAPSULE: at 14:20

## 2023-03-30 RX ADMIN — ACETAMINOPHEN 650 MG: 325 TABLET ORAL at 03:35

## 2023-03-30 RX ADMIN — HEPARIN 300 UNITS: 100 SYRINGE at 20:51

## 2023-03-30 RX ADMIN — METRONIDAZOLE 500 MG: 500 INJECTION, SOLUTION INTRAVENOUS at 18:16

## 2023-03-30 RX ADMIN — Medication 1 CAPSULE: at 09:19

## 2023-03-30 RX ADMIN — METRONIDAZOLE 500 MG: 500 INJECTION, SOLUTION INTRAVENOUS at 09:22

## 2023-03-30 RX ADMIN — METRONIDAZOLE 500 MG: 500 INJECTION, SOLUTION INTRAVENOUS at 03:38

## 2023-03-30 RX ADMIN — ACETAMINOPHEN 650 MG: 325 TABLET ORAL at 20:50

## 2023-03-30 RX ADMIN — CEFTRIAXONE SODIUM 2000 MG: 2 INJECTION, POWDER, FOR SOLUTION INTRAMUSCULAR; INTRAVENOUS at 17:07

## 2023-03-30 RX ADMIN — ENOXAPARIN SODIUM 40 MG: 100 INJECTION SUBCUTANEOUS at 09:19

## 2023-03-30 RX ADMIN — SODIUM CHLORIDE, PRESERVATIVE FREE 10 ML: 5 INJECTION INTRAVENOUS at 20:51

## 2023-03-30 ASSESSMENT — PAIN DESCRIPTION - LOCATION: LOCATION: ARM

## 2023-03-30 ASSESSMENT — PAIN SCALES - GENERAL
PAINLEVEL_OUTOF10: 0
PAINLEVEL_OUTOF10: 2
PAINLEVEL_OUTOF10: 0

## 2023-03-30 ASSESSMENT — PAIN DESCRIPTION - ORIENTATION: ORIENTATION: RIGHT

## 2023-03-30 ASSESSMENT — PAIN DESCRIPTION - DESCRIPTORS: DESCRIPTORS: ACHING;DISCOMFORT;SORE;SQUEEZING

## 2023-03-30 NOTE — PROGRESS NOTES
Chg double lumen picc line Placement 3/30/2023    Product number: [id number]   Lot Number: [id number]      Ultrasound: yes   Right Brachial vein:                Upper Arm Circumference: (CM) 41cm    Size:(FR)/GUAGE 5.5fr/41cm    Exposed Length: (CM) 3cm    Internal Length: (CM) 38cm   Cut: (CM) 14cm   Vein Measurement: 0.62cm    Be Madden RN  3/30/2023  10:28 AM    indira

## 2023-03-30 NOTE — PROGRESS NOTES
Hospitalist Progress Note      SYNOPSIS: Patient admitted on 3/21/2023 for     Patient admitted on 3/21/2023   29-year-old male with no past history of significance. Presented with right lower quadrant pain. Imaging showed possible acute appendicitis and he also had a large loculated left-sided pleural effusion with concern for pneumonia and empyema. Now status post left-sided VATS. Chest tube in place with CTS following. Patient developed nausea and vomiting on 3/27. CT abdomen is pending. ECHO was noted to have a pericardial effusion for which cardiology is following. Repeat ECHO done on 3/27 showing moderate pericardial effusion which is stable in size from initial ECHO. Surgical pathology of pleural and lung showed hyalinized pleural plaques with patchy chronic inflammation and hemosiderin deposition, no evidence of neoplasm or malignancy. Differentials include asbestos exposure. Further work-up ordered by pulmonology, including IgG4, IgG subclasses, ACE, IL-2 receptor levels. SUBJECTIVE:    Patient seen and examined in room. Alert awake oriented x3. Denies any chest pain, nausea, vomiting. Appears comfortable. Records reviewed. Stable overnight. No other overnight issues reported. Temp (24hrs), Av °F (36.7 °C), Min:96.9 °F (36.1 °C), Max:98.7 °F (37.1 °C)    DIET: ADULT ORAL NUTRITION SUPPLEMENT; Breakfast, Lunch, Dinner; Standard High Calorie/High Protein Oral Supplement  ADULT ORAL NUTRITION SUPPLEMENT; Breakfast, Lunch; Wound Healing Oral Supplement  ADULT DIET;  Regular  CODE: Full Code    Intake/Output Summary (Last 24 hours) at 3/30/2023 0997  Last data filed at 3/30/2023 0936  Gross per 24 hour   Intake 240 ml   Output 1170 ml   Net -930 ml       OBJECTIVE:    BP 92/64   Pulse 64   Temp 98 °F (36.7 °C) (Temporal)   Resp 16   Ht 5' 11\" (1.803 m)   Wt 165 lb (74.8 kg)   SpO2 98%   BMI 23.01 kg/m²     General appearance: No apparent distress, appears stated age and cooperative. HEENT:  Conjunctivae/corneas clear. Neck: Supple. No jugular venous distention. Respiratory: Clear to auscultation bilaterally, normal respiratory effort left side chest tube noted. Cardiovascular: Regular rate rhythm, normal S1-S2  Abdomen: Soft, nontender, nondistended  Musculoskeletal: No clubbing, cyanosis, no bilateral lower extremity edema. Brisk capillary refill. Skin:  No rashes  on visible skin  Neurologic: awake, alert and following commands     ASSESSMENT:     Principal Problem:    Acute appendicitis, treated with antibiotics, no surgical intervention planned. Active Problems:    Loculated pleural effusion    Vancomycin adverse reaction    Pericardial effusion    Ex-smoker    ADHD    Current every day vaping    H/o Lyme disease    Hypotension        PLAN:     1 no abdominal complaints and initial evaluation for acute appendicitis did not indicate by surgery that he needed a surgical procedure. 2.  Left-sided effusion now status post VATS procedure. Thoracentesis was initially attempted but fluid was too thick. Serologies in place for autoimmune and immunosuppression. 3.  Discomfort from the VATS procedure seems to be under control  4. Some degree of pericardial effusion however no tamponade. Repeat ECHO showing stable effusion. Appreciate cardiology input   Surgical pathology of pleural and lung showed hyalinized pleural plaques with patchy chronic inflammation and hemosiderin deposition, no evidence of neoplasm or malignancy. Differentials include asbestos exposure. Further work-up ordered by pulmonology, including IgG4, IgG subclasses, ACE, IL-2 receptor levels.   6. Repeat CT abdomen pending due to nausea and emesis on 3/28, did not show any evidence of mechanical obstructive process of bowel or inflammatory finding of the bowel, some stool burden noted  Plan for chest tube removal on 3/30  Continue ceftriaxone and Flagyl, possible plan for PICC line placement and long-term

## 2023-03-30 NOTE — PROGRESS NOTES
Constipation--expected delayed return of bowel function  --secondary to anesthesia, narcotics, decreased oral intake, and decreased physical activity   --resolved, +BM 3/28        5) N/V  -- improved   --CT abd pelvis yest- per primary and Gen surgery services            Dispo: continue supportive care           This patient's case and care plan was discussed with the attending surgeon

## 2023-03-30 NOTE — PLAN OF CARE
Bedside shift change report given to Rahel Lawson RN (oncoming nurse) by Arpita Caban RN (offgoing nurse). Report included the following information SBAR, Kardex, OR Summary, Procedure Summary, Intake/Output, MAR, Accordion, Recent Results and Med Rec Status. Problem: Discharge Planning  Goal: Discharge to home or other facility with appropriate resources  Outcome: Progressing     Problem: Skin/Tissue Integrity - Adult  Goal: Skin integrity remains intact  Recent Flowsheet Documentation  Taken 3/30/2023 3334 by Johan Chambers RN  Skin Integrity Remains Intact: Monitor for areas of redness and/or skin breakdown

## 2023-03-30 NOTE — PROGRESS NOTES
[address] [place] [address] [facility]  Progress note     HISTORY OF PRESENT ILLNESS:   42-year-old male with no past medical history presented with right lower quadrant abdominal pain. CT abdomen showed possible acute appendicitis. CT chest shows large left-sided loculated pleural effusion with possible pneumonia and empyema. CT surgery already evaluated for possible VATS in a.m. ID consulted for left-sided pleural effusion/empyema. Past Medical History:    No past medical history on file.   Past Surgical History:        Procedure Laterality Date    THORACOSCOPY Left 3/24/2023    THORACOSCOPY DECORTICATION VIDEO ASSISTED performed by Vasiliy Chu DO at [facility] OR     Current Medications:   Scheduled Meds:   lidocaine PF  5 mL IntraDERmal Once    sodium chloride flush  5-40 mL IntraVENous 2 times per day    heparin flush  3 mL IntraVENous 2 times per day    enoxaparin  40 mg SubCUTAneous Daily    acetaminophen  650 mg Oral Q6H    lactobacillus  1 capsule Oral TID    polyethylene glycol  17 g Oral BID    sennosides-docusate sodium  2 tablet Oral QPM    colchicine  0.6 mg Oral Daily    cefTRIAXone (ROCEPHIN) IV  2,000 mg IntraVENous Q24H    metroNIDAZOLE  500 mg IntraVENous Q8H    sodium chloride flush  10 mL IntraVENous 2 times per day     Continuous Infusions:   sodium chloride      sodium chloride      sodium chloride       PRN Meds:sodium chloride flush, sodium chloride, heparin flush, perflutren lipid microspheres, oxyCODONE **OR** oxyCODONE, morphine, ipratropium-albuterol, LORazepam, melatonin, bisacodyl, Menthol, sodium chloride, trimethobenzamide, diphenhydrAMINE, artificial tears, sodium chloride, sodium chloride flush, sodium chloride, ondansetron    Allergies:  Vancomycin    Social History:   Social History     Socioeconomic History    Marital status: Single     Spouse name: None    Number of children: None    Years of education: None    Highest education level: None   Tobacco Use Pending)       Microbiology:  Pending  No results for input(s): BC in the last 72 hours. No results for input(s): ORG in the last 72 hours. No results for input(s): Chalmer Stacks in the last 72 hours. No results for input(s): STREPNEUMAGU in the last 72 hours. No results for input(s): LP1UAG in the last 72 hours. No results for input(s): ASO in the last 72 hours. No results for input(s): CULTRESP in the last 72 hours. Assessment:  Large left-sided loculated pleural effusion  Community-acquired pneumonia  Acute appendicitis  Pericardial effusion without tamponade  Status post left-sided VATS with pleural biopsy and pleural fluid drainage  Vancomycin allergy with rash    Plan:    Continue ceftriaxone and Flagyl. Patient developed rash with vancomycin, tried with Benadryl prior to vancomycin administration but patient developed rash on the arm. DC vancomycin  Surgical cultures are no growth so far. HIV negative, Fungitell and serum galactomannan pending  Hepatitis screen pending  Left-sided chest tube has bloody drainage. Follow with CT surgery. ID will continue to follow  Surgical culture and pleural fluid cultures are no growth so far  Chest tube still in place  Waiting for path report   talked to family  Surg path no malignancy  Cytol neg for malignancy  Picc line today  He still has an extenisve workup    Thank you for having us see this patient in consultation. I will be discussing this case with the treating physicians.       Electronically signed by Jordan Faria MD on 3/30/2023 at 10:09 AM

## 2023-03-30 NOTE — PATIENT CARE CONFERENCE
[facility] Quality Flow/Interdisciplinary Rounds Progress Note        Quality Flow Rounds held on March 30, 2023    Disciplines Attending:  Bedside Nurse, , , and Nursing Unit Leadership    Gisell Clements III was admitted on 3/21/2023  2:48 PM    Anticipated Discharge Date:  Expected Discharge Date: 03/23/23    Disposition:    Arash Score:  Arash Scale Score: 20    Readmission Risk              Risk of Unplanned Readmission:  11           Discussed patient goal for the day, patient clinical progression, and barriers to discharge.   The following Goal(s) of the Day/Commitment(s) have been identified:  discharge Naeem Kelly 83, RN  March 30, 2023

## 2023-03-30 NOTE — PROGRESS NOTES
IU/mL <10     IgA 70 - 400 mg/dL   136   Total IgG 700 - 1600 mg/dL   863   IgM 40 - 230 mg/dL   161   Hep A IgM Non-Reactive   Non-Reactive    Hep B S Ag Interp Non-Reactive   Non-Reactive    Hep C Ab Interp Non-Reactive   Non-Reactive    Hep B Core Ab, IgM Non-Reactive   Non-Reactive        Micro:  No results for input(s): CULTRESP in the last 72 hours. No results for input(s): LABGRAM in the last 72 hours. No results for input(s): LEGUR in the last 72 hours. No results for input(s): STREPNEUMAGU in the last 72 hours. No results for input(s): LP1UAG in the last 72 hours. Assessment:    Loculated left pleural effusion with pleural thickening and pleural mass and caseating the entire left lung   status post left VATS ,3/24/23 with pleural-based mass status post multiple pleural biopsies and drainage of the loculated pleural effusion on 3/24/2023. Spleen with innumerable hypodense lesions differential diagnosis includes atypical infections, sarcoidosis, lymphoma      Plan:   Surgical pathology is reported as hyalinized pleural plaques with patchy chronic inflammation and hemosiderin deposition. Cased discussed with Dr Tawny Finch, pathology. Cytology negative. Repeat CT chest reviewed today with Dr Swati Pandey  IgG4 level, also IgG subclasses pending. ACE level, IL-2. Management of chest tube per CT surgery. Output recorded 570 cc in past 24 hrs. Ceftriaxone and flagyl per ID. Prior rash to vancomycin. Surgical cultures with no growth to date. HIV, serum galactomannan and fungitell all negative  Duonebs every 4 hrs w/a  GI prophylaxis. Bowel regimen  Will make referral to [facility] thoracic surgery team as OP upon discharge. Discussed with patient at bedside. This plan of care was reviewed in collaboration with Dr. Osei Franco  Electronically signed by SERGIO Ruiz CNP on 3/30/2023 at 3:44 PM    Addendum:      I personally saw, examined and provided care for the patient.  Radiographs, labs and medication list were reviewed by me independently. Repeat CT of the chest today does not show any significant change or improvement in patient's left hemithorax. I personally discussed patient's care with Loli Ch from infectious disease, Dr. Kirti Biggs from thoracic surgery and also Dr. Ingris Mcgill from pathology. There is no doubt that the patient's findings are consistent with a chronic disease even possibly years. So far pathology findings are consistent with a benign process not a malignant process. At this point it does not appear that much more can be done for patient. Considering patient's young age we agree that it might be in his best interest to have a second opinion at a tertiary center although I am not sure how much they can offer to patient. Personally contacted [facility] [employer] [facility] discussed patient's condition with Dr. Laurie Barraza. He graciously has agreed to see patient long with cardiothoracic in consultation at [facility] once patient is discharged. I will arrange for the referral.  Discussions with Dr. Kirti Biggs most likely his chest tube will be removed tomorrow. Discussed all my communications with patient and informed him. He is agreeable and willing to have A second opinion at [facility]. All his questions were answered. Reassurance was given. I spoke with bedside nursing, therapists and consultants. The case was discussed in detail and plans for care were established. Review of CNP documentation was conducted and revisions were made as appropriate. I agree with the above documented exam, problem list and plan of care.      Tomy Sesay MD

## 2023-03-30 NOTE — CARE COORDINATION
Care Coordination  The patient is post op day #6 Left Vats. The patient has a Left chest tube still in as well. Double lumen Picc line was placed today. Dr Lakesha Desai was just consulted and ordered a ct of the chest and it shows  crescentic shaped heterogeneous density left predominantly lateral hemithorax. Consistent with thickened pleura and/or empyema containing gas . There is subcutaneous emphysema left hemithorax. The patient has a left sided chest tube remaining. Per Dr Joss Cisse The patient is on Iv rocephin and Iv flagyl. Per Id they awaiting path report. [facility] is following the patient for home iv atb. The patient has an apt set up with Dr Jayme Langford in [place] for Friday March 31st at 2 pm. Dr Raul Perdomo  will follow the patints home iv atb until he gets established with Dr Jayme Langford. Patient will likely need to discharge Friday to establish with PCP and have home care admit after seen. Only if he is medically stable.

## 2023-03-31 ENCOUNTER — APPOINTMENT (OUTPATIENT)
Dept: GENERAL RADIOLOGY | Age: 23
DRG: 121 | End: 2023-03-31
Payer: MEDICAID

## 2023-03-31 ENCOUNTER — OFFICE VISIT (OUTPATIENT)
Dept: FAMILY MEDICINE CLINIC | Age: 23
End: 2023-03-31

## 2023-03-31 VITALS
DIASTOLIC BLOOD PRESSURE: 67 MMHG | WEIGHT: 165 LBS | SYSTOLIC BLOOD PRESSURE: 108 MMHG | RESPIRATION RATE: 14 BRPM | HEIGHT: 71 IN | BODY MASS INDEX: 23.1 KG/M2 | HEART RATE: 124 BPM | OXYGEN SATURATION: 98 % | TEMPERATURE: 97.1 F

## 2023-03-31 VITALS
WEIGHT: 165.2 LBS | BODY MASS INDEX: 23.13 KG/M2 | SYSTOLIC BLOOD PRESSURE: 104 MMHG | HEIGHT: 71 IN | RESPIRATION RATE: 16 BRPM | TEMPERATURE: 98.2 F | OXYGEN SATURATION: 95 % | DIASTOLIC BLOOD PRESSURE: 62 MMHG | HEART RATE: 112 BPM

## 2023-03-31 DIAGNOSIS — Z09 HOSPITAL DISCHARGE FOLLOW-UP: ICD-10-CM

## 2023-03-31 DIAGNOSIS — J90 PLEURAL EFFUSION: Primary | ICD-10-CM

## 2023-03-31 DIAGNOSIS — I31.39 PERICARDIAL EFFUSION: ICD-10-CM

## 2023-03-31 LAB
ACE SERPL-CCNC: 19 U/L (ref 16–85)
ANION GAP SERPL CALCULATED.3IONS-SCNC: 7 MMOL/L (ref 7–16)
BUN SERPL-MCNC: 7 MG/DL (ref 6–20)
CALCIUM SERPL-MCNC: 8.3 MG/DL (ref 8.6–10.2)
CHLORIDE SERPL-SCNC: 106 MMOL/L (ref 98–107)
CO2 SERPL-SCNC: 27 MMOL/L (ref 22–29)
CREAT SERPL-MCNC: 0.7 MG/DL (ref 0.7–1.2)
ERYTHROCYTE [DISTWIDTH] IN BLOOD BY AUTOMATED COUNT: 14.6 FL (ref 11.5–15)
GLUCOSE SERPL-MCNC: 88 MG/DL (ref 74–99)
HCT VFR BLD AUTO: 34.8 % (ref 37–54)
HGB BLD-MCNC: 11.6 G/DL (ref 12.5–16.5)
Lab: NORMAL
MCH RBC QN AUTO: 29.4 PG (ref 26–35)
MCHC RBC AUTO-ENTMCNC: 33.3 % (ref 32–34.5)
MCV RBC AUTO: 88.3 FL (ref 80–99.9)
PLATELET # BLD AUTO: 288 E9/L (ref 130–450)
PMV BLD AUTO: 8.8 FL (ref 7–12)
POTASSIUM SERPL-SCNC: 3.9 MMOL/L (ref 3.5–5)
RBC # BLD AUTO: 3.94 E12/L (ref 3.8–5.8)
SODIUM SERPL-SCNC: 140 MMOL/L (ref 132–146)
THIS TEST SENT TO: NORMAL
WBC # BLD: 5.4 E9/L (ref 4.5–11.5)

## 2023-03-31 PROCEDURE — 71045 X-RAY EXAM CHEST 1 VIEW: CPT

## 2023-03-31 PROCEDURE — 2580000003 HC RX 258: Performed by: NURSE PRACTITIONER

## 2023-03-31 PROCEDURE — 2500000003 HC RX 250 WO HCPCS: Performed by: NURSE PRACTITIONER

## 2023-03-31 PROCEDURE — 85027 COMPLETE CBC AUTOMATED: CPT

## 2023-03-31 PROCEDURE — 6360000002 HC RX W HCPCS: Performed by: INTERNAL MEDICINE

## 2023-03-31 PROCEDURE — 6370000000 HC RX 637 (ALT 250 FOR IP): Performed by: NURSE PRACTITIONER

## 2023-03-31 PROCEDURE — 2580000003 HC RX 258: Performed by: INTERNAL MEDICINE

## 2023-03-31 PROCEDURE — 80048 BASIC METABOLIC PNL TOTAL CA: CPT

## 2023-03-31 PROCEDURE — 36415 COLL VENOUS BLD VENIPUNCTURE: CPT

## 2023-03-31 RX ORDER — COLCHICINE 0.6 MG/1
0.6 TABLET ORAL DAILY
Qty: 14 TABLET | Refills: 0 | Status: SHIPPED | OUTPATIENT
Start: 2023-03-31 | End: 2023-04-14

## 2023-03-31 RX ORDER — LACTOBACILLUS RHAMNOSUS GG 10B CELL
1 CAPSULE ORAL 3 TIMES DAILY
Qty: 42 CAPSULE | Refills: 0 | Status: SHIPPED | OUTPATIENT
Start: 2023-03-31 | End: 2023-04-14

## 2023-03-31 RX ORDER — OXYCODONE HYDROCHLORIDE 5 MG/1
5 TABLET ORAL EVERY 4 HOURS PRN
Qty: 12 TABLET | Refills: 0 | Status: SHIPPED | OUTPATIENT
Start: 2023-03-31 | End: 2023-04-03

## 2023-03-31 RX ADMIN — ENOXAPARIN SODIUM 40 MG: 100 INJECTION SUBCUTANEOUS at 08:26

## 2023-03-31 RX ADMIN — METRONIDAZOLE 500 MG: 500 INJECTION, SOLUTION INTRAVENOUS at 12:05

## 2023-03-31 RX ADMIN — HEPARIN 300 UNITS: 100 SYRINGE at 08:26

## 2023-03-31 RX ADMIN — COLCHICINE 0.6 MG: 0.6 TABLET ORAL at 08:33

## 2023-03-31 RX ADMIN — SODIUM CHLORIDE, PRESERVATIVE FREE 10 ML: 5 INJECTION INTRAVENOUS at 08:27

## 2023-03-31 RX ADMIN — ACETAMINOPHEN 650 MG: 325 TABLET ORAL at 03:51

## 2023-03-31 RX ADMIN — METRONIDAZOLE 500 MG: 500 INJECTION, SOLUTION INTRAVENOUS at 03:50

## 2023-03-31 RX ADMIN — Medication 1 CAPSULE: at 08:25

## 2023-03-31 RX ADMIN — CEFTRIAXONE SODIUM 2000 MG: 2 INJECTION, POWDER, FOR SOLUTION INTRAMUSCULAR; INTRAVENOUS at 13:11

## 2023-03-31 SDOH — ECONOMIC STABILITY: HOUSING INSECURITY
IN THE LAST 12 MONTHS, WAS THERE A TIME WHEN YOU DID NOT HAVE A STEADY PLACE TO SLEEP OR SLEPT IN A SHELTER (INCLUDING NOW)?: NO

## 2023-03-31 SDOH — ECONOMIC STABILITY: FOOD INSECURITY: WITHIN THE PAST 12 MONTHS, THE FOOD YOU BOUGHT JUST DIDN'T LAST AND YOU DIDN'T HAVE MONEY TO GET MORE.: NEVER TRUE

## 2023-03-31 SDOH — ECONOMIC STABILITY: FOOD INSECURITY: WITHIN THE PAST 12 MONTHS, YOU WORRIED THAT YOUR FOOD WOULD RUN OUT BEFORE YOU GOT MONEY TO BUY MORE.: NEVER TRUE

## 2023-03-31 SDOH — ECONOMIC STABILITY: INCOME INSECURITY: HOW HARD IS IT FOR YOU TO PAY FOR THE VERY BASICS LIKE FOOD, HOUSING, MEDICAL CARE, AND HEATING?: NOT HARD AT ALL

## 2023-03-31 ASSESSMENT — ENCOUNTER SYMPTOMS
NAUSEA: 0
RHINORRHEA: 0
ABDOMINAL DISTENTION: 0
DIARRHEA: 0
ANAL BLEEDING: 0
APNEA: 0
EYE PAIN: 0
BACK PAIN: 0
COLOR CHANGE: 0
CONSTIPATION: 0
SHORTNESS OF BREATH: 0
ABDOMINAL PAIN: 0
EYE REDNESS: 0
COUGH: 0
VOICE CHANGE: 0
TROUBLE SWALLOWING: 0
CHOKING: 0
EYE DISCHARGE: 0
RECTAL PAIN: 0
FACIAL SWELLING: 0
BLOOD IN STOOL: 0
SINUS PAIN: 0
WHEEZING: 0
CHEST TIGHTNESS: 0
STRIDOR: 0
EYE ITCHING: 0
SINUS PRESSURE: 0
SORE THROAT: 0
VOMITING: 0
PHOTOPHOBIA: 0

## 2023-03-31 ASSESSMENT — PATIENT HEALTH QUESTIONNAIRE - PHQ9
SUM OF ALL RESPONSES TO PHQ QUESTIONS 1-9: 0
SUM OF ALL RESPONSES TO PHQ9 QUESTIONS 1 & 2: 0
SUM OF ALL RESPONSES TO PHQ QUESTIONS 1-9: 0
SUM OF ALL RESPONSES TO PHQ QUESTIONS 1-9: 0
1. LITTLE INTEREST OR PLEASURE IN DOING THINGS: 0
SUM OF ALL RESPONSES TO PHQ QUESTIONS 1-9: 0
2. FEELING DOWN, DEPRESSED OR HOPELESS: 0

## 2023-03-31 ASSESSMENT — PAIN DESCRIPTION - ORIENTATION: ORIENTATION: RIGHT

## 2023-03-31 ASSESSMENT — PAIN SCALES - GENERAL
PAINLEVEL_OUTOF10: 0
PAINLEVEL_OUTOF10: 2

## 2023-03-31 ASSESSMENT — PAIN DESCRIPTION - DESCRIPTORS: DESCRIPTORS: ACHING;DISCOMFORT;SORE

## 2023-03-31 ASSESSMENT — PAIN DESCRIPTION - LOCATION: LOCATION: ARM

## 2023-03-31 NOTE — HOME CARE
[facility] CARE IV PRICING:    [employer] % COVERAGE      [id number] [facility] WILL NEED PHARMACY AGREEMENT FORM COMPLETED AND IV SCRIPT FAXED [phone] PRIOR TO PATIENT DISCHARGE. PATIENT WILL NEED TO AGREE TO IV PRICING AND WILLING TO LEARN IVAT INFUSION IN THE HOME PRIOR TO DISCHARGE.     Cristy Beltran LPN, [facility]

## 2023-03-31 NOTE — PATIENT CARE CONFERENCE
[facility] Quality Flow/Interdisciplinary Rounds Progress Note        Quality Flow Rounds held on March 31, 2023    Disciplines Attending:  Bedside Nurse, , , and Nursing Unit Leadership    Santos Navarrete III was admitted on 3/21/2023  2:48 PM    Anticipated Discharge Date:  Expected Discharge Date: 03/23/23    Disposition:    Arash Score:  Arash Scale Score: 23    Readmission Risk              Risk of Unplanned Readmission:  11           Discussed patient goal for the day, patient clinical progression, and barriers to discharge.   The following Goal(s) of the Day/Commitment(s) have been identified:  Diagnostics - Report Results and keep him informed      Randy Perry RN  March 31, 2023

## 2023-03-31 NOTE — PROGRESS NOTES
CLINICAL PHARMACY NOTE: MEDS TO BEDS    Total # of Prescriptions Filled: 3   The following medications were delivered to the patient:  Colchicine 0.6  Culturelle digestive   Oxycodone 5     Additional Documentation:

## 2023-03-31 NOTE — PROGRESS NOTES
Evaristo Cuellar M.D.,[facility]  Rafael Charlton D.O., F.[email or web address]., Rich Villa M.D. Sade Maza M.D. Emelia Ho D.O. Daily Pulmonary Progress Note    Patient:  Nata Clements III 21 y.o. male MRN: [id number]     Date of Service: 3/31/2023      Synopsis     We are following patient for Status post VATS, diffuse hyalinized pleural plaques found on pathology    \"CC\" abdominal pain    Code status: FULL       Subjective      Patient was seen and examined. Ct chest removed per surgery. Outpatient referral to Dr Dieudonne Rockwell upon dc at [facility] Review of Systems:  Constitutional: Denies fever, weight loss, night sweats, and fatigue  Skin: Denies pigmentation, dark lesions, and rashes   HEENT: Denies hearing loss, tinnitus, ear drainage, epistaxis, sore throat, and hoarseness. Cardiovascular: Denies palpitations, chest pain, and chest pressure.   Respiratory: left side chest incision discomfort  Gastrointestinal: Denies nausea, vomiting, poor appetite, diarrhea, heartburn or reflux  Genitourinary: Denies dysuria, frequency, urgency or hematuria  Musculoskeletal: Denies myalgias, muscle weakness, and bone pain  Neurological: Denies dizziness, vertigo, headache, and focal weakness  Psychological: Denies anxiety and depression  Endocrine: Denies heat intolerance and cold intolerance  Hematopoietic/Lymphatic: Denies bleeding problems and blood transfusions    24-hour events:  None     Objective   Vitals: /67   Pulse (!) 124   Temp 97.1 °F (36.2 °C) (Temporal)   Resp 14   Ht 5' 11\" (1.803 m)   Wt 165 lb (74.8 kg)   SpO2 98%   BMI 23.01 kg/m²     I/O:    Intake/Output Summary (Last 24 hours) at 3/31/2023 1324  Last data filed at 3/31/2023 0640  Gross per 24 hour   Intake 180 ml   Output 1060 ml   Net -880 ml                          CURRENT MEDS :  Scheduled Meds:   cefTRIAXone (ROCEPHIN) IV  2,000 mg IntraVENous Q24H    lidocaine PF  5 mL IntraDERmal Once    sodium pneumonitis. CXR  FINDINGS:   Heart appears enlarged however the left heart border is not well visualized. Again noted is a large left pleural effusion with a small amount of remaining   pneumatized left upper lung. Left chest tube catheter is stable. Right lung   is clear. Osseous structures are unchanged. Impression   Large left pleural effusion and left chest tube catheter. No significant   interval change in the appearance of the chest.           ECHO  Summary   limited echocardiogram to follow up on pericardial effusion   Compared to prior echo, no changes noted. Technically adequate study. There is a moderate pericardial effusion noted. no echocardiographic signs of tamponade physiology. Labs:  Lab Results   Component Value Date/Time    WBC 5.4 03/31/2023 06:30 AM    HGB 11.6 03/31/2023 06:30 AM    HCT 34.8 03/31/2023 06:30 AM    MCV 88.3 03/31/2023 06:30 AM    MCH 29.4 03/31/2023 06:30 AM    MCHC 33.3 03/31/2023 06:30 AM    RDW 14.6 03/31/2023 06:30 AM     03/31/2023 06:30 AM    MPV 8.8 03/31/2023 06:30 AM     Lab Results   Component Value Date/Time     03/31/2023 06:30 AM    K 3.9 03/31/2023 06:30 AM    K 4.0 03/23/2023 07:54 AM     03/31/2023 06:30 AM    CO2 27 03/31/2023 06:30 AM    BUN 7 03/31/2023 06:30 AM    CREATININE 0.7 03/31/2023 06:30 AM    LABALBU 3.7 03/23/2023 07:54 AM    CALCIUM 8.3 03/31/2023 06:30 AM    LABGLOM >60 03/31/2023 06:30 AM     Lab Results   Component Value Date/Time    PROTIME 17.6 03/22/2023 08:53 AM    INR 1.6 03/22/2023 08:53 AM     No results for input(s): PROBNP in the last 72 hours. No results for input(s): PROCAL in the last 72 hours. This SmartLink has not been configured with any valid records.       Latest Reference Range & Units 3/23/23 07:54 3/23/23 10:27 3/29/23 20:00   RICK NEGATIVE   NEGATIVE    Rheumatoid Factor 0 - 13 IU/mL <10     IgA 70 - 400 mg/dL   136   Total IgG 700 - 1600 mg/dL   863   IgM 40 - 230

## 2023-03-31 NOTE — PROGRESS NOTES
POD#6  Awake, alert. No complaints. Denies CP, palpitations, SOB at rest, dizziness/lightheadedness. Vitals:    03/30/23 2049 03/30/23 2325 03/31/23 0343 03/31/23 0714   BP: 119/82 101/70 (!) 105/59 (!) 101/55   Pulse: (!) 105 92 72 67   Resp: 18 18 20 16   Temp: 97.5 °F (36.4 °C) 97.5 °F (36.4 °C) 97 °F (36.1 °C) 97.1 °F (36.2 °C)   TempSrc: Temporal Temporal Temporal Temporal   SpO2: 98% 97% 95% 98%   Weight:       Height:         O2: RA      Intake/Output Summary (Last 24 hours) at 3/31/2023 0926  Last data filed at 3/31/2023 0640  Gross per 24 hour   Intake 180 ml   Output 1690 ml   Net -1510 ml             Recent Labs     03/29/23  0500 03/30/23  0503 03/31/23  0630   WBC 5.4 5.2 5.4   HGB 10.9* 12.6 11.6*   HCT 34.3* 38.9 34.8*    321 288      Recent Labs     03/29/23  0500 03/30/23  0503 03/31/23  0630   BUN 2* 4* 7   CREATININE 0.7 0.6* 0.7         Telemetry: NSR      PE  Cardiac: RRR  Lungs: decreased bases  Chest incision C/D/I, approximated, no erythema. Chest tubes x 1 present and secure. Abd: Soft, nontender, +BS  Ext: TABARES          POD#6    A/P:     1) Loculated pleural effusion  --Stable s/p L VATS, drainage of pleural effusion, chest wall biopsy on 3/24/23  --surgical path showing Hyalinized pleural plaque  --VSS  --tolerating RA  --chest tube without continued output and no airleak.  CT removed without difficulty  --repeat CT chest done yesterday, reviewed by Dr Gardner Life  --Increase activity as tolerated  --Encourage incentive spirometry   --lovenox for dvt prophylaxis  --Pulmonary on board -- appreciate input        2) Expected acute post operative anemia secondary to surgery  --stable--hgb  today 11.6        3) Post operative pain intolerance  --tolerable on current regimen        4) Constipation--expected delayed return of bowel function  --secondary to anesthesia, narcotics, decreased oral intake, and decreased physical activity   --resolved, +BM 3/28        5) N/V  -- improved   --CT

## 2023-03-31 NOTE — CARE COORDINATION
Care Coordiantion  Received the Iv atb script from Dr Melody Koehler  for Iv rocephin and called [facility] and spoke to [place] and they dont have this patient on there list. Noted referral made to [facility] on 3/29/23. They will have to run his benefits and get a price and make sure that's all set up before he can even leave. He must get his Iv rocephin dose  today prior to discharge. I will reschedule his New physician apt with Dr Tom Romo in [address] for Monday if possible. Dr Nadege Boggs notified of this and well as the patient. Await call from [facility] his Iv atb rx was faxed to [facility]. Per Dr Jazmyn Talley all the patients information was sent to the [facility] for him to get a second option with Dr Chau Carlson upon discharge.

## 2023-03-31 NOTE — PROGRESS NOTES
reveals no wheezing, crackles, or rhonchi. Cardiovascular: S1 and S2 are rhythmic and regular. No murmurs appreciated. Abdomen: Positive bowel sounds to auscultation. Pain in the right lower quadrant. . No masses felt. No hepatosplenomegaly. Genitourinary: Deferred  Extremities: No clubbing, no cyanosis, no edema.   Musculoskeletal: Pain in range of motion of any joints  Neurological: Following commands, no focal neurodeficit  Lines: peripheral      CBC+dif:  Recent Labs     03/29/23  0500 03/30/23  0503 03/31/23 0630   WBC 5.4 5.2 5.4   HGB 10.9* 12.6 11.6*   HCT 34.3* 38.9 34.8*   MCV 89.1 88.8 88.3    321 288     Lab Results   Component Value Date    CRP 4.8 (H) 03/23/2023     No results found for: CRPHS  Lab Results   Component Value Date    SEDRATE 20 (H) 03/23/2023     Lab Results   Component Value Date    ALT 8 03/23/2023    AST 12 03/23/2023    ALKPHOS 121 03/23/2023    BILITOT 0.5 03/23/2023     Lab Results   Component Value Date/Time     03/31/2023 06:30 AM    K 3.9 03/31/2023 06:30 AM    K 4.0 03/23/2023 07:54 AM     03/31/2023 06:30 AM    CO2 27 03/31/2023 06:30 AM    BUN 7 03/31/2023 06:30 AM    CREATININE 0.7 03/31/2023 06:30 AM    LABGLOM >60 03/31/2023 06:30 AM    GLUCOSE 88 03/31/2023 06:30 AM    PROT 7.0 03/23/2023 07:54 AM    LABALBU 3.7 03/23/2023 07:54 AM    CALCIUM 8.3 03/31/2023 06:30 AM    BILITOT 0.5 03/23/2023 07:54 AM    ALKPHOS 121 03/23/2023 07:54 AM    AST 12 03/23/2023 07:54 AM    ALT 8 03/23/2023 07:54 AM       Lab Results   Component Value Date/Time    PROTIME 17.6 03/22/2023 08:53 AM    INR 1.6 03/22/2023 08:53 AM       No results found for: TSH    No results found for: NITRITE, COLORU, PHUR, LABCAST, WBCUA, RBCUA, MUCUS, TRICHOMONAS, YEAST, BACTERIA, CLARITYU, SPECGRAV, LEUKOCYTESUR, UROBILINOGEN, BILIRUBINUR, BLOODU, GLUCOSEU, AMORPHOUS    No results found for: Ellis, BEART, K8OVEAIG, PHART, THGBART, BYZ4TAD, PO2ART, [address]  Radiology:  CT CHEST WO pleural fluid cultures are no growth so far  Chest tube still in place  Waiting for path report   talked to family  Surg path no malignancy  Cytol neg for malignancy  Picc line today  He still has an extenisve workup  It appears everyone has signed off   at this point will reconcile 14 days of IV antibiotics   folouwp with ID     Thank you for having us see this patient in consultation. I will be discussing this case with the treating physicians.       Electronically signed by Jesica Tijerina MD on 3/31/2023 at 8:34 AM

## 2023-03-31 NOTE — PLAN OF CARE
Problem: Discharge Planning  Goal: Discharge to home or other facility with appropriate resources  3/31/2023 0219 by Kvng Nugent RN  Outcome: Progressing     Problem: ABCDS Injury Assessment  Goal: Absence of physical injury  Outcome: Progressing     Problem: Safety - Adult  Goal: Free from fall injury  Outcome: Progressing

## 2023-03-31 NOTE — PROGRESS NOTES
3 days. Max Daily Amount: 30 mg     Vyvanse 60 MG Caps  Generic drug: Lisdexamfetamine Dimesylate                Medications marked \"taking\" at this time  Outpatient Medications Marked as Taking for the 3/31/23 encounter (Office Visit) with SERGIO Stokes CNP   Medication Sig Dispense Refill    oxyCODONE (ROXICODONE) 5 MG immediate release tablet Take 1 tablet by mouth every 4 hours as needed for Pain for up to 3 days. Max Daily Amount: 30 mg 12 tablet 0    lactobacillus (CULTURELLE) CAPS capsule Take 1 capsule by mouth 3 times daily for 14 days 42 capsule 0    colchicine (COLCRYS) 0.6 MG tablet Take 1 tablet by mouth daily for 14 days 14 tablet 0    cefTRIAXone (ROCEPHIN) infusion Infuse 2,000 mg intravenously every 24 hours for 14 days Compound per protocol 28 g 0    Lisdexamfetamine Dimesylate (VYVANSE) 60 MG CAPS Take 60 mg by mouth daily. Medications patient taking as of now reconciled against medications ordered at time of hospital discharge: Yes        Objective:    /62 (Site: Left Upper Arm, Position: Sitting, Cuff Size: Large Adult)   Pulse (!) 112   Temp 98.2 °F (36.8 °C) (Temporal)   Resp 16   Ht 5' 11\" (1.803 m)   Wt 165 lb 3.2 oz (74.9 kg)   SpO2 95%   BMI 23.04 kg/m²         An electronic signature was used to authenticate this note.   --SERGIO Stokes CNP

## 2023-03-31 NOTE — DISCHARGE SUMMARY
COMPARISON: 03/22/2023 chest CT and 03/30/2023 7:50 a.m. chest radiograph. HISTORY: ORDERING SYSTEM PROVIDED HISTORY: Follow-up postoperatively status post left VATS TECHNOLOGIST PROVIDED HISTORY: Reason for exam:->Follow-up postoperatively status post left VATS What reading provider will be dictating this exam?->CRC FINDINGS: Mediastinum: There is a right IJ central venous catheter with the tip in the distal superior vena cava. There is pericardial effusion, decreased in volume but still moderate. No discrete mediastinal or hilar masses or adenopathy. Lungs/pleura: There is linear density in the right middle lobe. There is opacification of the left lung and left hemithorax with exception of a small area of pneumatized left upper lobe. There is crescentic shaped pleural thickening left lateral hemithorax with several gas lucencies. There is left upper and lower lobe consolidation and adjacent pleural fluid. There is a left sided chest tube however the chest tube appears to be positioned within consolidated lung posteriorly. There are several gas lucencies within the thickened pleura or empyema at the left lung base anterolaterally best seen on image 110 series 301. There is subcutaneous emphysema left anterolateral and posterior hemithorax. Upper Abdomen: Small density in the gallbladder is probably due to gallstone. The adrenal glands are normal. Soft Tissues/Bones: No acute osseous abnormalities. There is dextroconvex curvature of the thoracic spine. Crescentic shaped heterogeneous density left predominantly lateral hemithorax consistent with thickened pleura and/or empyema containing gas in some of the locations as described. Left upper and lower lobe consolidation and pleural fluid. A left chest tube appears to be positioned within consolidated lung on several of the images. There is subcutaneous emphysema left hemithorax. Pericardial effusion, probably decreased in volume.  Right middle lobe hydronephrosis or perinephric edema. No retroperitoneal lymphadenopathy. Urinary bladder is normal in contour. There is atelectasis seen in the left lung base with volume loss. There is a moderate left sub pleural effusion of increased attenuation. Moderate pericardial effusion. Severe central canal stenosis seen at L4/L5 related to disc herniation. 1. Findings above suggest acute appendicitis. 2. Small free fluid in the pelvis. 3. Mild distension of the gallbladder with suspected small pericholecystic fluid versus gallbladder wall thickening. Ultrasound gallbladder could be helpful for further evaluation. 4. Volume loss associated with visualized left lower lung with subpleural effusion of increased attenuation. Clinical correlation recommended. 5. Atelectatic changes are also present in visualized left lung base. 6. Moderate pericardial effusion. XR CHEST PORTABLE    Result Date: 3/30/2023  EXAMINATION: ONE XRAY VIEW OF THE CHEST 3/30/2023 8:19 am COMPARISON: 03/29/2023 HISTORY: ORDERING SYSTEM PROVIDED HISTORY: s/p vats, evaluate lung fields and tube(s) TECHNOLOGIST PROVIDED HISTORY: Reason for exam:->s/p vats, evaluate lung fields and tube(s) What reading provider will be dictating this exam?->CRC FINDINGS: Heart appears enlarged however the left heart border is not well visualized. Again noted is a large left pleural effusion with a small amount of remaining pneumatized left upper lung. Left chest tube catheter is stable. Right lung is clear. Osseous structures are unchanged. Large left pleural effusion and left chest tube catheter.   No significant interval change in the appearance of the chest.     XR CHEST PORTABLE    Result Date: 3/29/2023  EXAMINATION: ONE XRAY VIEW OF THE CHEST 3/29/2023 7:54 am COMPARISON: 03/20/2023 HISTORY: ORDERING SYSTEM PROVIDED HISTORY: s/p vats, evaluate lung fields and tube(s) TECHNOLOGIST PROVIDED HISTORY: Reason for exam:->s/p vats, evaluate lung fields

## 2023-04-01 LAB
IGG1 SER-MCNC: 415 MG/DL (ref 240–1118)
IGG2 SER-MCNC: 257 MG/DL (ref 124–549)
IGG3 SER-MCNC: 38 MG/DL (ref 21–134)
IGG4 SER-MCNC: 35 MG/DL (ref 1–123)

## 2023-04-02 LAB
BACTERIA FLD AEROBE CULT: NORMAL
BACTERIA FLD AEROBE CULT: NORMAL
GRAM STN SPEC: NORMAL
GRAM STN SPEC: NORMAL
REPORT: NORMAL
THIS TEST SENT TO: NORMAL

## 2023-04-03 LAB
ALBUMIN SERPL-MCNC: 3.4 G/DL (ref 3.5–5.2)
ALP SERPL-CCNC: 76 U/L (ref 40–129)
ALT SERPL-CCNC: 33 U/L (ref 0–40)
ANION GAP SERPL CALCULATED.3IONS-SCNC: 11 MMOL/L (ref 7–16)
AST SERPL-CCNC: 27 U/L (ref 0–39)
BASOPHILS # BLD: 0.05 E9/L (ref 0–0.2)
BASOPHILS NFR BLD: 0.7 % (ref 0–2)
BILIRUB SERPL-MCNC: <0.2 MG/DL (ref 0–1.2)
BUN SERPL-MCNC: 12 MG/DL (ref 6–20)
CALCIUM SERPL-MCNC: 8.8 MG/DL (ref 8.6–10.2)
CHLORIDE SERPL-SCNC: 106 MMOL/L (ref 98–107)
CO2 SERPL-SCNC: 23 MMOL/L (ref 22–29)
CREAT SERPL-MCNC: 0.7 MG/DL (ref 0.7–1.2)
CRP SERPL HS-MCNC: 1.4 MG/DL (ref 0–0.4)
EOSINOPHIL # BLD: 0.21 E9/L (ref 0.05–0.5)
EOSINOPHIL NFR BLD: 2.9 % (ref 0–6)
ERYTHROCYTE [DISTWIDTH] IN BLOOD BY AUTOMATED COUNT: 16.1 FL (ref 11.5–15)
GLUCOSE SERPL-MCNC: 68 MG/DL (ref 74–99)
HCT VFR BLD AUTO: 40.5 % (ref 37–54)
HGB BLD-MCNC: 12.3 G/DL (ref 12.5–16.5)
IMM GRANULOCYTES # BLD: 0.09 E9/L
IMM GRANULOCYTES NFR BLD: 1.3 % (ref 0–5)
LYMPHOCYTES # BLD: 0.73 E9/L (ref 1.5–4)
LYMPHOCYTES NFR BLD: 10.1 % (ref 20–42)
Lab: NORMAL
MCH RBC QN AUTO: 29.1 PG (ref 26–35)
MCHC RBC AUTO-ENTMCNC: 30.4 % (ref 32–34.5)
MCV RBC AUTO: 95.7 FL (ref 80–99.9)
MONOCYTES # BLD: 0.83 E9/L (ref 0.1–0.95)
MONOCYTES NFR BLD: 11.5 % (ref 2–12)
NEUTROPHILS # BLD: 5.29 E9/L (ref 1.8–7.3)
NEUTS SEG NFR BLD: 73.5 % (ref 43–80)
PLATELET # BLD AUTO: 352 E9/L (ref 130–450)
PMV BLD AUTO: 9.8 FL (ref 7–12)
POTASSIUM SERPL-SCNC: 4.8 MMOL/L (ref 3.5–5)
PROT SERPL-MCNC: 6.2 G/DL (ref 6.4–8.3)
RBC # BLD AUTO: 4.23 E12/L (ref 3.8–5.8)
REPORT: NORMAL
SODIUM SERPL-SCNC: 140 MMOL/L (ref 132–146)
THIS TEST SENT TO: NORMAL
WBC # BLD: 7.2 E9/L (ref 4.5–11.5)

## 2023-04-04 LAB — ERYTHROCYTE [SEDIMENTATION RATE] IN BLOOD BY WESTERGREN METHOD: 2 MM/HR (ref 0–15)

## 2023-04-05 LAB
Lab: NORMAL
REPORT: NORMAL
THIS TEST SENT TO: NORMAL

## 2023-04-06 LAB
ALBUMIN SERPL-MCNC: 3.6 G/DL (ref 3.5–5.2)
ALP SERPL-CCNC: 92 U/L (ref 40–129)
ALT SERPL-CCNC: 24 U/L (ref 0–40)
ANION GAP SERPL CALCULATED.3IONS-SCNC: 10 MMOL/L (ref 7–16)
AST SERPL-CCNC: 23 U/L (ref 0–39)
BASOPHILS # BLD: 0.04 E9/L (ref 0–0.2)
BASOPHILS NFR BLD: 0.6 % (ref 0–2)
BILIRUB SERPL-MCNC: 0.3 MG/DL (ref 0–1.2)
BUN SERPL-MCNC: 12 MG/DL (ref 6–20)
CALCIUM SERPL-MCNC: 8.9 MG/DL (ref 8.6–10.2)
CHLORIDE SERPL-SCNC: 105 MMOL/L (ref 98–107)
CO2 SERPL-SCNC: 24 MMOL/L (ref 22–29)
CREAT SERPL-MCNC: 0.6 MG/DL (ref 0.7–1.2)
EOSINOPHIL # BLD: 0.16 E9/L (ref 0.05–0.5)
EOSINOPHIL NFR BLD: 2.5 % (ref 0–6)
ERYTHROCYTE [DISTWIDTH] IN BLOOD BY AUTOMATED COUNT: 15.7 FL (ref 11.5–15)
GLUCOSE SERPL-MCNC: 114 MG/DL (ref 74–99)
HCT VFR BLD AUTO: 38.6 % (ref 37–54)
HGB BLD-MCNC: 12.4 G/DL (ref 12.5–16.5)
IMM GRANULOCYTES # BLD: 0.05 E9/L
IMM GRANULOCYTES NFR BLD: 0.8 % (ref 0–5)
LYMPHOCYTES # BLD: 0.62 E9/L (ref 1.5–4)
LYMPHOCYTES NFR BLD: 9.7 % (ref 20–42)
MCH RBC QN AUTO: 29.6 PG (ref 26–35)
MCHC RBC AUTO-ENTMCNC: 32.1 % (ref 32–34.5)
MCV RBC AUTO: 92.1 FL (ref 80–99.9)
MONOCYTES # BLD: 0.52 E9/L (ref 0.1–0.95)
MONOCYTES NFR BLD: 8.2 % (ref 2–12)
NEUTROPHILS # BLD: 4.99 E9/L (ref 1.8–7.3)
NEUTS SEG NFR BLD: 78.2 % (ref 43–80)
PLATELET # BLD AUTO: 346 E9/L (ref 130–450)
PMV BLD AUTO: 10.6 FL (ref 7–12)
POTASSIUM SERPL-SCNC: 4 MMOL/L (ref 3.5–5)
PROT SERPL-MCNC: 6.8 G/DL (ref 6.4–8.3)
RBC # BLD AUTO: 4.19 E12/L (ref 3.8–5.8)
SODIUM SERPL-SCNC: 139 MMOL/L (ref 132–146)
WBC # BLD: 6.4 E9/L (ref 4.5–11.5)

## 2023-04-07 ENCOUNTER — OFFICE VISIT (OUTPATIENT)
Dept: FAMILY MEDICINE CLINIC | Age: 23
End: 2023-04-07
Payer: MEDICAID

## 2023-04-07 VITALS
WEIGHT: 165.4 LBS | OXYGEN SATURATION: 97 % | HEIGHT: 71 IN | BODY MASS INDEX: 23.15 KG/M2 | SYSTOLIC BLOOD PRESSURE: 124 MMHG | HEART RATE: 94 BPM | DIASTOLIC BLOOD PRESSURE: 72 MMHG | TEMPERATURE: 97.5 F

## 2023-04-07 DIAGNOSIS — J90 LOCULATED PLEURAL EFFUSION: Primary | ICD-10-CM

## 2023-04-07 DIAGNOSIS — I31.39 PERICARDIAL EFFUSION: ICD-10-CM

## 2023-04-07 PROCEDURE — G8427 DOCREV CUR MEDS BY ELIG CLIN: HCPCS | Performed by: NURSE PRACTITIONER

## 2023-04-07 PROCEDURE — 1111F DSCHRG MED/CURRENT MED MERGE: CPT | Performed by: NURSE PRACTITIONER

## 2023-04-07 PROCEDURE — 1036F TOBACCO NON-USER: CPT | Performed by: NURSE PRACTITIONER

## 2023-04-07 PROCEDURE — G8420 CALC BMI NORM PARAMETERS: HCPCS | Performed by: NURSE PRACTITIONER

## 2023-04-07 PROCEDURE — 99214 OFFICE O/P EST MOD 30 MIN: CPT | Performed by: NURSE PRACTITIONER

## 2023-04-07 ASSESSMENT — ENCOUNTER SYMPTOMS
NAUSEA: 0
EYE ITCHING: 0
SHORTNESS OF BREATH: 0
EYE DISCHARGE: 0
DIARRHEA: 0
EYE PAIN: 0
WHEEZING: 0
TROUBLE SWALLOWING: 0
SINUS PRESSURE: 0
STRIDOR: 0
CHEST TIGHTNESS: 0
COLOR CHANGE: 0
ABDOMINAL PAIN: 0
VOMITING: 0
APNEA: 0
RECTAL PAIN: 0
PHOTOPHOBIA: 0
EYE REDNESS: 0
VOICE CHANGE: 0
CONSTIPATION: 0
FACIAL SWELLING: 0
BLOOD IN STOOL: 0
CHOKING: 0
SORE THROAT: 0
SINUS PAIN: 0
COUGH: 0
RHINORRHEA: 0
BACK PAIN: 0
ABDOMINAL DISTENTION: 0
ANAL BLEEDING: 0

## 2023-04-07 NOTE — PROGRESS NOTES
Physical Exam  Vitals and nursing note reviewed. Constitutional:       General: He is not in acute distress. Appearance: Normal appearance. He is normal weight. He is not ill-appearing, toxic-appearing or diaphoretic. HENT:      Head: Normocephalic and atraumatic. Right Ear: Tympanic membrane, ear canal and external ear normal. There is no impacted cerumen. Left Ear: Tympanic membrane, ear canal and external ear normal. There is no impacted cerumen. Nose: Nose normal. No congestion or rhinorrhea. Mouth/Throat:      Mouth: Mucous membranes are moist.      Pharynx: Oropharynx is clear. No oropharyngeal exudate or posterior oropharyngeal erythema. Eyes:      General: No scleral icterus. Right eye: No discharge. Left eye: No discharge. Extraocular Movements: Extraocular movements intact. Conjunctiva/sclera: Conjunctivae normal.      Pupils: Pupils are equal, round, and reactive to light. Neck:      Vascular: No carotid bruit. Cardiovascular:      Rate and Rhythm: Normal rate and regular rhythm. Pulses: Normal pulses. Heart sounds: Normal heart sounds. No murmur heard. No friction rub. No gallop. Comments: PICC line right arm asymptomatic. Pulmonary:      Effort: Pulmonary effort is normal. No respiratory distress. Breath sounds: No stridor. No wheezing, rhonchi or rales. Chest:      Chest wall: No tenderness. Abdominal:      General: Abdomen is flat. Bowel sounds are normal. There is no distension. Palpations: Abdomen is soft. There is no mass. Tenderness: There is no abdominal tenderness. There is no right CVA tenderness, left CVA tenderness, guarding or rebound. Hernia: No hernia is present. Musculoskeletal:         General: No swelling, tenderness, deformity or signs of injury. Normal range of motion. Cervical back: Normal range of motion and neck supple. No rigidity. No muscular tenderness.       Right

## 2023-04-17 LAB
FUNGUS SPEC CULT: NORMAL
LOEFFLER MB STN SPEC: NORMAL

## 2023-04-18 LAB
ACID FAST STN SPEC QL: NORMAL
MYCOBACTERIUM SPEC CULT: NORMAL

## 2023-04-24 LAB
FUNGUS SPEC CULT: NORMAL
LOEFFLER MB STN SPEC: NORMAL

## 2023-04-25 LAB
ACID FAST STN SPEC QL: NORMAL
MYCOBACTERIUM SPEC CULT: NORMAL

## 2023-04-26 ENCOUNTER — TELEPHONE (OUTPATIENT)
Dept: CARDIOLOGY | Age: 23
End: 2023-04-26

## 2023-04-28 ENCOUNTER — TELEPHONE (OUTPATIENT)
Dept: FAMILY MEDICINE CLINIC | Age: 23
End: 2023-04-28

## 2023-04-28 NOTE — TELEPHONE ENCOUNTER
Care Transitions Initial Follow Up Call    Outreach made within 2 business days of discharge: Yes    Patient: Saul Casillas III Patient : 2000   MRN: [id number]  Reason for Admission: There are no discharge diagnoses documented for the most recent discharge. Discharge Date: 2023       Spoke with: Darrion Wolfe     Discharge department/facility: [place] [address] Patient Contact:  Was patient able to fill all prescriptions: Yes  Was patient instructed to bring all medications to the follow-up visit: Yes  Is patient taking all medications as directed in the discharge summary?  Yes  Does patient understand their discharge instructions: Yes  Does patient have questions or concerns that need addressed prior to 7-14 day follow up office visit: no    Scheduled appointment with PCP within 7-14 days    Follow Up  Future Appointments   Date Time Provider Rafi Matthews   5/3/2023 11:00 AM Birdie Laverta Grief, APRN - CNP [facility]   2023  3:00 PM SERGIO Rojas CNP [facility]   2023 11:40 AM Ramakrishna Carlin,  Andrea Pis[place]

## 2023-04-28 NOTE — TELEPHONE ENCOUNTER
[id number] [address] [facility] discharge planner called. Patient is discharging today and needs a hospital follow up with Kaushik Arriaza next week. I scheduled him for Wednesday 5/3/2023 at 11:00 a.m. They will put the appointment information in his discharge paperwork.

## 2023-05-01 LAB
FUNGUS SPEC CULT: NORMAL
LOEFFLER MB STN SPEC: NORMAL

## 2023-05-02 LAB
ACID FAST STN SPEC QL: NORMAL
MYCOBACTERIUM SPEC CULT: NORMAL

## 2023-05-03 ENCOUNTER — OFFICE VISIT (OUTPATIENT)
Dept: FAMILY MEDICINE CLINIC | Age: 23
End: 2023-05-03

## 2023-05-03 VITALS
TEMPERATURE: 97.4 F | OXYGEN SATURATION: 99 % | BODY MASS INDEX: 23.49 KG/M2 | RESPIRATION RATE: 17 BRPM | DIASTOLIC BLOOD PRESSURE: 74 MMHG | WEIGHT: 168.4 LBS | SYSTOLIC BLOOD PRESSURE: 122 MMHG | HEART RATE: 82 BPM

## 2023-05-03 DIAGNOSIS — Z09 HOSPITAL DISCHARGE FOLLOW-UP: ICD-10-CM

## 2023-05-03 DIAGNOSIS — I31.39 PERICARDIAL EFFUSION: ICD-10-CM

## 2023-05-03 DIAGNOSIS — J90 LOCULATED PLEURAL EFFUSION: Primary | ICD-10-CM

## 2023-05-03 RX ORDER — AMOXICILLIN AND CLAVULANATE POTASSIUM 875; 125 MG/1; MG/1
TABLET, FILM COATED ORAL
COMMUNITY
Start: 2023-04-28

## 2023-05-03 RX ORDER — OXYCODONE HYDROCHLORIDE 5 MG/1
TABLET ORAL
COMMUNITY
Start: 2023-04-28

## 2023-05-03 RX ORDER — PSEUDOEPHED/ACETAMINOPH/DIPHEN 30MG-500MG
TABLET ORAL
COMMUNITY
Start: 2023-04-28

## 2023-05-03 ASSESSMENT — ENCOUNTER SYMPTOMS
WHEEZING: 0
CHEST TIGHTNESS: 0
FACIAL SWELLING: 0
DIARRHEA: 0
BACK PAIN: 0
VOICE CHANGE: 0
ABDOMINAL DISTENTION: 0
COUGH: 0
VOMITING: 0
BLOOD IN STOOL: 0
EYE REDNESS: 0
EYE PAIN: 0
TROUBLE SWALLOWING: 0
SHORTNESS OF BREATH: 0
RECTAL PAIN: 0
CONSTIPATION: 0
EYE ITCHING: 0
RHINORRHEA: 0
EYE DISCHARGE: 0
ANAL BLEEDING: 0
PHOTOPHOBIA: 0
SINUS PRESSURE: 0
COLOR CHANGE: 0
STRIDOR: 0
SORE THROAT: 0
CHOKING: 0
ABDOMINAL PAIN: 0
APNEA: 0
SINUS PAIN: 0
NAUSEA: 0

## 2023-05-03 ASSESSMENT — PATIENT HEALTH QUESTIONNAIRE - PHQ9
SUM OF ALL RESPONSES TO PHQ QUESTIONS 1-9: 0
2. FEELING DOWN, DEPRESSED OR HOPELESS: 0
1. LITTLE INTEREST OR PLEASURE IN DOING THINGS: 0
SUM OF ALL RESPONSES TO PHQ QUESTIONS 1-9: 0
SUM OF ALL RESPONSES TO PHQ9 QUESTIONS 1 & 2: 0

## 2023-05-03 NOTE — PROGRESS NOTES
Capillary Refill: Capillary refill takes less than 2 seconds. Coloration: Skin is not jaundiced or pale. Findings: No bruising, erythema, lesion or rash. Comments: Thoracotomy site well approximated with staples intact, no redness or swelling. 3 previous chest tube sites asymptomatic. Neurological:      General: No focal deficit present. Mental Status: He is alert and oriented to person, place, and time. Mental status is at baseline. Cranial Nerves: No cranial nerve deficit. Sensory: No sensory deficit. Motor: No weakness. Coordination: Coordination normal.      Gait: Gait normal.      Deep Tendon Reflexes: Reflexes normal.   Psychiatric:         Mood and Affect: Mood normal.         Behavior: Behavior normal.         Thought Content: Thought content normal.         Judgment: Judgment normal.       Assessment and Plan:  There are no diagnoses linked to this encounter. Discussions/Education provided to patients during visit:  [] Discussed the importance to stop smoking. [] Advised to monitor eating habits. [] Reviewed and discussed Imaging results. [] Reviewed and discussed Lab results. [] Discussed the importance of drinking plenty of fluids. [] Cut down on Salt and Caffeine.  [] Exercise 2-3 times weekly, if not more. [] Cut down on Sugar and Fats. [x] Continue Medications as Discussed. [x] Communicated with patient any concerns, to phone office. [x] Follow up as directed. No follow-ups on file. Seen By:      SERGIO Oakley CNP     Post-Discharge Transitional Care  Follow Up      Svitlana Mckeon III   YOB: 2000    Date of Office Visit:  5/3/2023  Date of Hospital Admission: 4/13/23  Date of Hospital Discharge: 4/13/23  Risk of hospital readmission (high >=14%.  Medium >=10%) :Readmission Risk Score: 4.6      Care management risk score Rising risk (score 2-5) and Complex Care (Scores >=6): No Risk Score On File     Non face to

## 2023-05-09 LAB
ACID FAST STN SPEC QL: NORMAL
MYCOBACTERIUM SPEC CULT: NORMAL

## 2023-05-16 LAB
ACID FAST STN SPEC QL: NORMAL
MYCOBACTERIUM SPEC CULT: NORMAL

## 2023-08-01 ENCOUNTER — OFFICE VISIT (OUTPATIENT)
Dept: FAMILY MEDICINE CLINIC | Age: 23
End: 2023-08-01
Payer: MEDICAID

## 2023-08-01 VITALS
TEMPERATURE: 97.5 F | DIASTOLIC BLOOD PRESSURE: 68 MMHG | SYSTOLIC BLOOD PRESSURE: 102 MMHG | WEIGHT: 168.3 LBS | RESPIRATION RATE: 16 BRPM | OXYGEN SATURATION: 97 % | BODY MASS INDEX: 23.56 KG/M2 | HEART RATE: 72 BPM | HEIGHT: 71 IN

## 2023-08-01 DIAGNOSIS — I31.39 PERICARDIAL EFFUSION: Primary | ICD-10-CM

## 2023-08-01 DIAGNOSIS — Z09 HOSPITAL DISCHARGE FOLLOW-UP: ICD-10-CM

## 2023-08-01 DIAGNOSIS — J90 LOCULATED PLEURAL EFFUSION: ICD-10-CM

## 2023-08-01 PROCEDURE — 1111F DSCHRG MED/CURRENT MED MERGE: CPT | Performed by: NURSE PRACTITIONER

## 2023-08-01 PROCEDURE — 99214 OFFICE O/P EST MOD 30 MIN: CPT | Performed by: NURSE PRACTITIONER

## 2023-08-01 PROCEDURE — 1036F TOBACCO NON-USER: CPT | Performed by: NURSE PRACTITIONER

## 2023-08-01 PROCEDURE — G8420 CALC BMI NORM PARAMETERS: HCPCS | Performed by: NURSE PRACTITIONER

## 2023-08-01 PROCEDURE — G8427 DOCREV CUR MEDS BY ELIG CLIN: HCPCS | Performed by: NURSE PRACTITIONER

## 2023-08-01 ASSESSMENT — ENCOUNTER SYMPTOMS
BLOOD IN STOOL: 0
SHORTNESS OF BREATH: 0
EYE ITCHING: 0
WHEEZING: 0
EYE REDNESS: 0
COLOR CHANGE: 0
ANAL BLEEDING: 0
DIARRHEA: 0
ABDOMINAL DISTENTION: 0
VOMITING: 0
EYE DISCHARGE: 0
TROUBLE SWALLOWING: 0
RECTAL PAIN: 0
CHEST TIGHTNESS: 0
SINUS PRESSURE: 0
EYE PAIN: 0
SORE THROAT: 0
RHINORRHEA: 0
COUGH: 0
CONSTIPATION: 0
SINUS PAIN: 0
NAUSEA: 0
PHOTOPHOBIA: 0
APNEA: 0
CHOKING: 0
VOICE CHANGE: 0
FACIAL SWELLING: 0
ABDOMINAL PAIN: 0
STRIDOR: 0
BACK PAIN: 0

## 2023-08-01 NOTE — PROGRESS NOTES
deficit. Sensory: No sensory deficit. Motor: No weakness. Coordination: Coordination normal.      Gait: Gait normal.      Deep Tendon Reflexes: Reflexes normal.   Psychiatric:         Mood and Affect: Mood normal.         Behavior: Behavior normal.         Thought Content: Thought content normal.         Judgment: Judgment normal.       Assessment and Plan:  Manuel Atkinson was seen today for follow-up from hospital.    Diagnoses and all orders for this visit:    Pericardial effusion    Loculated pleural effusion        Discussions/Education provided to patients during visit:  [] Discussed the importance to stop smoking. [] Advised to monitor eating habits. [] Reviewed and discussed Imaging results. [] Reviewed and discussed Lab results. [] Discussed the importance of drinking plenty of fluids. [] Cut down on Salt and Caffeine.  [] Exercise 2-3 times weekly, if not more. [] Cut down on Sugar and Fats. [x] Continue Medications as Discussed. [x] Communicated with patient any concerns, to phone office. [x] Follow up as directed. Return in about 1 month (around 9/1/2023).       Seen By:      Jeraline Runner, APRN - CNP

## 2023-08-21 ENCOUNTER — PATIENT MESSAGE (OUTPATIENT)
Dept: FAMILY MEDICINE CLINIC | Age: 23
End: 2023-08-21

## 2023-08-21 DIAGNOSIS — J90 PLEURAL EFFUSION: Primary | ICD-10-CM

## 2023-08-21 NOTE — TELEPHONE ENCOUNTER
CT of the chest and no respiratory issues, also states that he put the wrong date his appointment is the 24th. He sees his cardiologist tomorrow.

## 2023-08-22 ENCOUNTER — HOSPITAL ENCOUNTER (OUTPATIENT)
Dept: CT IMAGING | Age: 23
Discharge: HOME | End: 2023-08-24
Payer: MEDICAID

## 2023-08-22 DIAGNOSIS — J90 PLEURAL EFFUSION: Primary | ICD-10-CM

## 2023-08-22 DIAGNOSIS — J90 PLEURAL EFFUSION: ICD-10-CM

## 2023-08-22 PROCEDURE — 6360000004 HC RX CONTRAST MEDICATION: Performed by: RADIOLOGY

## 2023-08-22 PROCEDURE — 71260 CT THORAX DX C+: CPT

## 2023-08-22 PROCEDURE — 2580000003 HC RX 258: Performed by: RADIOLOGY

## 2023-08-22 RX ORDER — SODIUM CHLORIDE 0.9 % (FLUSH) 0.9 %
10 SYRINGE (ML) INJECTION PRN
Status: DISCONTINUED | OUTPATIENT
Start: 2023-08-22 | End: 2023-08-25 | Stop reason: HOSPADM

## 2023-08-22 RX ADMIN — IOPAMIDOL 50 ML: 755 INJECTION, SOLUTION INTRAVENOUS at 12:52

## 2023-08-22 RX ADMIN — SODIUM CHLORIDE, PRESERVATIVE FREE 10 ML: 5 INJECTION INTRAVENOUS at 12:52

## 2023-09-12 ENCOUNTER — OFFICE VISIT (OUTPATIENT)
Dept: FAMILY MEDICINE CLINIC | Age: 23
End: 2023-09-12
Payer: MEDICAID

## 2023-09-12 VITALS
OXYGEN SATURATION: 98 % | WEIGHT: 173 LBS | BODY MASS INDEX: 24.22 KG/M2 | SYSTOLIC BLOOD PRESSURE: 98 MMHG | DIASTOLIC BLOOD PRESSURE: 68 MMHG | HEIGHT: 71 IN | TEMPERATURE: 98 F | HEART RATE: 78 BPM | RESPIRATION RATE: 18 BRPM

## 2023-09-12 DIAGNOSIS — F90.2 ATTENTION DEFICIT HYPERACTIVITY DISORDER (ADHD), COMBINED TYPE: ICD-10-CM

## 2023-09-12 DIAGNOSIS — K35.80 ACUTE APPENDICITIS, UNSPECIFIED ACUTE APPENDICITIS TYPE: ICD-10-CM

## 2023-09-12 DIAGNOSIS — J90 LOCULATED PLEURAL EFFUSION: ICD-10-CM

## 2023-09-12 DIAGNOSIS — I31.39 PERICARDIAL EFFUSION: ICD-10-CM

## 2023-09-12 DIAGNOSIS — R10.84 GENERALIZED ABDOMINAL PAIN: Primary | ICD-10-CM

## 2023-09-12 PROCEDURE — G8427 DOCREV CUR MEDS BY ELIG CLIN: HCPCS | Performed by: NURSE PRACTITIONER

## 2023-09-12 PROCEDURE — 1036F TOBACCO NON-USER: CPT | Performed by: NURSE PRACTITIONER

## 2023-09-12 PROCEDURE — G8420 CALC BMI NORM PARAMETERS: HCPCS | Performed by: NURSE PRACTITIONER

## 2023-09-12 PROCEDURE — 99214 OFFICE O/P EST MOD 30 MIN: CPT | Performed by: NURSE PRACTITIONER

## 2023-09-18 ASSESSMENT — ENCOUNTER SYMPTOMS
EYE DISCHARGE: 0
CONSTIPATION: 0
STRIDOR: 0
WHEEZING: 0
NAUSEA: 1
EYE REDNESS: 0
BLOOD IN STOOL: 0
RHINORRHEA: 0
FACIAL SWELLING: 0
COUGH: 0
SINUS PRESSURE: 0
VOICE CHANGE: 0
ABDOMINAL DISTENTION: 0
APNEA: 0
BACK PAIN: 0
DIARRHEA: 0
ABDOMINAL PAIN: 1
EYE PAIN: 0
EYE ITCHING: 0
CHOKING: 0
VOMITING: 1
SORE THROAT: 0
SINUS PAIN: 0
PHOTOPHOBIA: 0
COLOR CHANGE: 0
TROUBLE SWALLOWING: 0
ANAL BLEEDING: 0
RECTAL PAIN: 0
CHEST TIGHTNESS: 0
SHORTNESS OF BREATH: 0

## 2023-09-18 NOTE — PROGRESS NOTES
23  Aden Kebede III : 2000 Sex: male  Age: 21 y.o. Chief Complaint   Patient presents with    Other     Lung collapse     GI Problem       Patient complains of abdominal discomfort and abdominal pain. States he has moving his bowels without difficulty. No melena or hematochezia or hematic emesis. He has been having some nausea and vomiting but that has settled down. He does have a history of the pericardial effusion and the pleural effusions and by CT scan those are improving. Review of Systems   Constitutional:  Negative for activity change, appetite change, chills, diaphoresis, fatigue, fever and unexpected weight change. HENT:  Negative for congestion, dental problem, drooling, ear discharge, ear pain, facial swelling, hearing loss, mouth sores, nosebleeds, postnasal drip, rhinorrhea, sinus pressure, sinus pain, sneezing, sore throat, tinnitus, trouble swallowing and voice change. Eyes:  Negative for photophobia, pain, discharge, redness, itching and visual disturbance. Respiratory:  Negative for apnea, cough, choking, chest tightness, shortness of breath, wheezing and stridor. Cardiovascular:  Negative for chest pain, palpitations and leg swelling. Gastrointestinal:  Positive for abdominal pain, nausea and vomiting. Negative for abdominal distention, anal bleeding, blood in stool, constipation, diarrhea and rectal pain. Endocrine: Negative for cold intolerance, heat intolerance, polydipsia, polyphagia and polyuria. Genitourinary:  Negative for decreased urine volume, difficulty urinating, dysuria, enuresis, flank pain, frequency, genital sores, hematuria and urgency. Musculoskeletal:  Negative for arthralgias, back pain, gait problem, joint swelling, myalgias, neck pain and neck stiffness. Skin:  Negative for color change, pallor, rash and wound. Allergic/Immunologic: Negative for environmental allergies, food allergies and immunocompromised state.

## 2023-12-14 ENCOUNTER — OFFICE VISIT (OUTPATIENT)
Dept: FAMILY MEDICINE CLINIC | Age: 23
End: 2023-12-14
Payer: MEDICAID

## 2023-12-14 VITALS
BODY MASS INDEX: 23.8 KG/M2 | RESPIRATION RATE: 17 BRPM | HEART RATE: 77 BPM | DIASTOLIC BLOOD PRESSURE: 62 MMHG | HEIGHT: 71 IN | SYSTOLIC BLOOD PRESSURE: 100 MMHG | OXYGEN SATURATION: 98 % | WEIGHT: 170 LBS | TEMPERATURE: 98 F

## 2023-12-14 DIAGNOSIS — R07.81 PAIN IN RIB: Primary | ICD-10-CM

## 2023-12-14 DIAGNOSIS — I31.39 PERICARDIAL EFFUSION: ICD-10-CM

## 2023-12-14 DIAGNOSIS — R55 SYNCOPE, UNSPECIFIED SYNCOPE TYPE: ICD-10-CM

## 2023-12-14 DIAGNOSIS — J90 LOCULATED PLEURAL EFFUSION: ICD-10-CM

## 2023-12-14 PROCEDURE — G8420 CALC BMI NORM PARAMETERS: HCPCS | Performed by: NURSE PRACTITIONER

## 2023-12-14 PROCEDURE — 1036F TOBACCO NON-USER: CPT | Performed by: NURSE PRACTITIONER

## 2023-12-14 PROCEDURE — G8484 FLU IMMUNIZE NO ADMIN: HCPCS | Performed by: NURSE PRACTITIONER

## 2023-12-14 PROCEDURE — G8427 DOCREV CUR MEDS BY ELIG CLIN: HCPCS | Performed by: NURSE PRACTITIONER

## 2023-12-14 PROCEDURE — 99214 OFFICE O/P EST MOD 30 MIN: CPT | Performed by: NURSE PRACTITIONER

## 2023-12-14 NOTE — PROGRESS NOTES
23  Nissa Welch III : 2000 Sex: male  Age: 21 y.o. Chief Complaint   Patient presents with    3 Month Follow-Up       Patient is here today for 3-month checkup. He states that a few days ago he was laying on the couch on his left side which is the side that he had a pleural effusion and multiple chest tubes in the past.  He states that when he went to get up he heard a popping sound and there was severe pain which made him pass out. He was \"incoherence \"for a couple of minutes. He states nothing like that had ever happened in terms of passing out before. He has heard popping sounds before in the left chest.  The pain had never been bad like that before. Review of Systems   Constitutional:  Negative for activity change, appetite change, chills, diaphoresis, fatigue, fever and unexpected weight change. HENT:  Negative for congestion, dental problem, drooling, ear discharge, ear pain, facial swelling, hearing loss, mouth sores, nosebleeds, postnasal drip, rhinorrhea, sinus pressure, sinus pain, sneezing, sore throat, tinnitus, trouble swallowing and voice change. Eyes:  Negative for photophobia, pain, discharge, redness, itching and visual disturbance. Respiratory:  Negative for apnea, cough, choking, chest tightness, shortness of breath, wheezing and stridor. Cardiovascular:  Positive for chest pain. Negative for palpitations and leg swelling. Gastrointestinal:  Negative for abdominal distention, abdominal pain, anal bleeding, blood in stool, constipation, diarrhea, nausea, rectal pain and vomiting. Endocrine: Negative for cold intolerance, heat intolerance, polydipsia, polyphagia and polyuria. Genitourinary:  Negative for decreased urine volume, difficulty urinating, dysuria, enuresis, flank pain, frequency, genital sores, hematuria and urgency.    Musculoskeletal:  Negative for arthralgias, back pain, gait problem, joint swelling, myalgias, neck pain and neck

## 2024-05-22 ENCOUNTER — OFFICE VISIT (OUTPATIENT)
Age: 24
End: 2024-05-22

## 2024-05-22 VITALS
WEIGHT: 167.2 LBS | BODY MASS INDEX: 23.41 KG/M2 | SYSTOLIC BLOOD PRESSURE: 118 MMHG | OXYGEN SATURATION: 93 % | HEART RATE: 72 BPM | DIASTOLIC BLOOD PRESSURE: 70 MMHG | HEIGHT: 71 IN | TEMPERATURE: 97.8 F

## 2024-05-22 DIAGNOSIS — R05.1 ACUTE COUGH: ICD-10-CM

## 2024-05-22 DIAGNOSIS — R05.1 ACUTE COUGH: Primary | ICD-10-CM

## 2024-05-22 LAB
ALBUMIN: 4.4 G/DL (ref 3.5–5.2)
ALP BLD-CCNC: 153 U/L (ref 40–129)
ALT SERPL-CCNC: 16 U/L (ref 0–40)
ANION GAP SERPL CALCULATED.3IONS-SCNC: 12 MMOL/L (ref 7–16)
AST SERPL-CCNC: 17 U/L (ref 0–39)
BASOPHILS ABSOLUTE: 0.05 K/UL (ref 0–0.2)
BASOPHILS RELATIVE PERCENT: 1 % (ref 0–2)
BILIRUB SERPL-MCNC: 0.6 MG/DL (ref 0–1.2)
BUN BLDV-MCNC: 14 MG/DL (ref 6–20)
CALCIUM SERPL-MCNC: 9.2 MG/DL (ref 8.6–10.2)
CHLORIDE BLD-SCNC: 102 MMOL/L (ref 98–107)
CO2: 24 MMOL/L (ref 22–29)
CREAT SERPL-MCNC: 0.9 MG/DL (ref 0.7–1.2)
EOSINOPHILS ABSOLUTE: 0.05 K/UL (ref 0.05–0.5)
EOSINOPHILS RELATIVE PERCENT: 1 % (ref 0–6)
GFR, ESTIMATED: >90 ML/MIN/1.73M2
GLUCOSE BLD-MCNC: 119 MG/DL (ref 74–99)
HCT VFR BLD CALC: 43.8 % (ref 37–54)
HEMOGLOBIN: 14.3 G/DL (ref 12.5–16.5)
INFLUENZA A ANTIBODY: NEGATIVE
INFLUENZA B ANTIBODY: NEGATIVE
LYMPHOCYTES ABSOLUTE: 0.78 K/UL (ref 1.5–4)
LYMPHOCYTES RELATIVE PERCENT: 15 % (ref 20–42)
Lab: NORMAL
MCH RBC QN AUTO: 29.6 PG (ref 26–35)
MCHC RBC AUTO-ENTMCNC: 32.6 G/DL (ref 32–34.5)
MCV RBC AUTO: 90.7 FL (ref 80–99.9)
MONOCYTES ABSOLUTE: 0.05 K/UL (ref 0.1–0.95)
MONOCYTES RELATIVE PERCENT: 1 % (ref 2–12)
NEUTROPHILS ABSOLUTE: 4.38 K/UL (ref 1.8–7.3)
NEUTROPHILS RELATIVE PERCENT: 83 % (ref 43–80)
PDW BLD-RTO: 13.6 % (ref 11.5–15)
PERFORMING INSTRUMENT: NORMAL
PLATELET # BLD: 209 K/UL (ref 130–450)
PMV BLD AUTO: 10.1 FL (ref 7–12)
POTASSIUM SERPL-SCNC: 4.1 MMOL/L (ref 3.5–5)
QC PASS/FAIL: NORMAL
RBC # BLD: 4.83 M/UL (ref 3.8–5.8)
RBC # BLD: ABNORMAL 10*6/UL
RBC # BLD: ABNORMAL 10*6/UL
SARS-COV-2, POC: NORMAL
SODIUM BLD-SCNC: 138 MMOL/L (ref 132–146)
TOTAL PROTEIN: 7.7 G/DL (ref 6.4–8.3)
WBC # BLD: 5.3 K/UL (ref 4.5–11.5)

## 2024-05-22 RX ORDER — IPRATROPIUM BROMIDE AND ALBUTEROL SULFATE 2.5; .5 MG/3ML; MG/3ML
1 SOLUTION RESPIRATORY (INHALATION) ONCE
Status: COMPLETED | OUTPATIENT
Start: 2024-05-22 | End: 2024-05-22

## 2024-05-22 RX ADMIN — IPRATROPIUM BROMIDE AND ALBUTEROL SULFATE 1 DOSE: 2.5; .5 SOLUTION RESPIRATORY (INHALATION) at 14:22

## 2024-05-22 ASSESSMENT — ENCOUNTER SYMPTOMS
PHOTOPHOBIA: 0
CONSTIPATION: 0
FACIAL SWELLING: 0
CHEST TIGHTNESS: 0
VOMITING: 0
SINUS PAIN: 0
COUGH: 0
APNEA: 0
ABDOMINAL DISTENTION: 0
SHORTNESS OF BREATH: 0
RHINORRHEA: 0
COLOR CHANGE: 0
DIARRHEA: 0
BLOOD IN STOOL: 0
SINUS PRESSURE: 0

## 2024-05-22 ASSESSMENT — PATIENT HEALTH QUESTIONNAIRE - PHQ9
1. LITTLE INTEREST OR PLEASURE IN DOING THINGS: NOT AT ALL
SUM OF ALL RESPONSES TO PHQ QUESTIONS 1-9: 0
SUM OF ALL RESPONSES TO PHQ9 QUESTIONS 1 & 2: 0
SUM OF ALL RESPONSES TO PHQ QUESTIONS 1-9: 0
2. FEELING DOWN, DEPRESSED OR HOPELESS: NOT AT ALL
SUM OF ALL RESPONSES TO PHQ QUESTIONS 1-9: 0
SUM OF ALL RESPONSES TO PHQ QUESTIONS 1-9: 0

## 2024-05-22 NOTE — PROGRESS NOTES
Juve Arellano III is a 24 y.o. male accompanied by girlfriend  CC:   Chief Complaint   Patient presents with    Cough     Patient has complaints of cough, congestion, and sinus drainage. Symptoms started this morning.       Cough:  Greater than 1 week of congestion  Started with cough today  The cough is positive for hemoptysis.  Patient does show me a picture of a pink frothy substance in a towel.  Patient does state that last night he was having hot flashes  He did not take his temperature  She does not know if he was febrile.  He has not had any chills      Is important to note the patient does have a history of a loculated pleural effusion as well as pericardial effusion within the last year.  This has not been thoroughly evaluated as there was some incident where the patient did not have his insurance company, and they denied the CAT scan at [facility].  Where he was falling.  He does have a follow-up with his primary care tomorrow.    Patient's past medical, surgical, social and/or family history reviewed, updated in chart, and are non-contributory (unless otherwise stated).  Medications and allergies also reviewed and updated in chart.      /70   Pulse 72   Temp 97.8 °F (36.6 °C) (Temporal)   Ht 1.803 m (5' 10.98\")   Wt 75.8 kg (167 lb 3.2 oz)   SpO2 93%   BMI 23.33 kg/m²     Review of Systems   Constitutional:  Negative for chills, fatigue and fever.   HENT:  Negative for congestion, ear pain, facial swelling, rhinorrhea, sinus pressure and sinus pain.    Eyes:  Negative for photophobia and visual disturbance.   Respiratory:  Negative for apnea, cough, chest tightness and shortness of breath.    Cardiovascular:  Negative for chest pain and palpitations.   Gastrointestinal:  Negative for abdominal distention, blood in stool, constipation, diarrhea and vomiting.   Endocrine: Negative for cold intolerance, polydipsia, polyphagia and polyuria.   Genitourinary:  Negative for decreased urine volume,

## 2024-05-23 ENCOUNTER — OFFICE VISIT (OUTPATIENT)
Dept: FAMILY MEDICINE CLINIC | Age: 24
End: 2024-05-23
Payer: MEDICAID

## 2024-05-23 VITALS
OXYGEN SATURATION: 94 % | BODY MASS INDEX: 23.65 KG/M2 | DIASTOLIC BLOOD PRESSURE: 72 MMHG | WEIGHT: 168.9 LBS | SYSTOLIC BLOOD PRESSURE: 110 MMHG | HEART RATE: 68 BPM | TEMPERATURE: 97.6 F | HEIGHT: 71 IN

## 2024-05-23 DIAGNOSIS — I31.39 PERICARDIAL EFFUSION: ICD-10-CM

## 2024-05-23 DIAGNOSIS — J90 PLEURAL EFFUSION: Primary | ICD-10-CM

## 2024-05-23 PROCEDURE — 1036F TOBACCO NON-USER: CPT | Performed by: NURSE PRACTITIONER

## 2024-05-23 PROCEDURE — G8427 DOCREV CUR MEDS BY ELIG CLIN: HCPCS | Performed by: NURSE PRACTITIONER

## 2024-05-23 PROCEDURE — G8420 CALC BMI NORM PARAMETERS: HCPCS | Performed by: NURSE PRACTITIONER

## 2024-05-23 PROCEDURE — 99214 OFFICE O/P EST MOD 30 MIN: CPT | Performed by: NURSE PRACTITIONER

## 2024-05-23 RX ORDER — ALBUTEROL SULFATE 90 UG/1
2 AEROSOL, METERED RESPIRATORY (INHALATION) 4 TIMES DAILY PRN
Qty: 54 G | Refills: 1 | Status: SHIPPED | OUTPATIENT
Start: 2024-05-23

## 2024-05-23 ASSESSMENT — ENCOUNTER SYMPTOMS
VOMITING: 0
ABDOMINAL PAIN: 0
TROUBLE SWALLOWING: 0
RECTAL PAIN: 0
BLOOD IN STOOL: 0
ABDOMINAL DISTENTION: 0
EYE REDNESS: 0
SINUS PRESSURE: 0
WHEEZING: 1
BACK PAIN: 0
APNEA: 0
DIARRHEA: 0
VOICE CHANGE: 0
EYE PAIN: 0
FACIAL SWELLING: 0
COUGH: 1
COLOR CHANGE: 0
SORE THROAT: 0
CHEST TIGHTNESS: 1
EYE ITCHING: 0
CHOKING: 0
CONSTIPATION: 0
SINUS PAIN: 0
RHINORRHEA: 0
EYE DISCHARGE: 0
STRIDOR: 0
PHOTOPHOBIA: 0
NAUSEA: 0
SHORTNESS OF BREATH: 1
ANAL BLEEDING: 0

## 2024-05-23 NOTE — PROGRESS NOTES
P    24  Juve Arellano III : 2000 Sex: male  Age: 24 y.o.    Chief Complaint   Patient presents with    Referral - General     Patient is here to discuss a referral for a thoracic surgeon and to have a CT of the chest performed.       Juve is here today for a follow-up regarding his recurrent pleural effusion and pericardial effusion.  He has been following at [facility] for this however he has been having problems here recently getting his insurance to cover his CAT scan and his follow-ups with his cardiothoracic surgeon there.  He wants to get a referral in this area for his CAT scans and his cardiothoracic surgeon.  He is having more shortness of breath here the last few days and did come in and see Dr. Stone yesterday in the walk-in clinic.  He did get a nebulizer treatment which did help to relax his breathing.  He is feeling a little bit tight again this morning.  He has had some wheezing.  No fever, chills, sweats.  No chest pain or palpitation.  He has been expectorating pink frothy mucus the last few days.        Review of Systems   Constitutional:  Negative for activity change, appetite change, chills, diaphoresis, fatigue, fever and unexpected weight change.   HENT:  Negative for congestion, dental problem, drooling, ear discharge, ear pain, facial swelling, hearing loss, mouth sores, nosebleeds, postnasal drip, rhinorrhea, sinus pressure, sinus pain, sneezing, sore throat, tinnitus, trouble swallowing and voice change.    Eyes:  Negative for photophobia, pain, discharge, redness, itching and visual disturbance.   Respiratory:  Positive for cough, chest tightness, shortness of breath and wheezing. Negative for apnea, choking and stridor.    Cardiovascular:  Negative for chest pain, palpitations and leg swelling.   Gastrointestinal:  Negative for abdominal distention, abdominal pain, anal bleeding, blood in stool, constipation, diarrhea, nausea, rectal pain and vomiting.   Endocrine: Negative for

## 2024-05-24 ENCOUNTER — TELEPHONE (OUTPATIENT)
Dept: FAMILY MEDICINE CLINIC | Age: 24
End: 2024-05-24

## 2024-05-24 ENCOUNTER — TELEPHONE (OUTPATIENT)
Dept: CARDIOTHORACIC SURGERY | Age: 24
End: 2024-05-24

## 2024-05-24 DIAGNOSIS — R06.02 SHORTNESS OF BREATH: Primary | ICD-10-CM

## 2024-05-24 NOTE — TELEPHONE ENCOUNTER
Cardio-thorasic sx calling to say as soon as Juve has a CT Chest and Echocardiogram done, then they will give him an appt.

## 2024-06-06 ENCOUNTER — TELEPHONE (OUTPATIENT)
Dept: FAMILY MEDICINE CLINIC | Age: 24
End: 2024-06-06

## 2024-06-06 NOTE — TELEPHONE ENCOUNTER
Spoke with the patient's mother in regards to his surgeon being out of network with the patient's insurance plan. She requested that a prior auth be done so that he could continue to see Dr. Beal with [facility].  I called the insurance company and spoke with representative Dimple.  She started a prior auth and requested it for a year.  It was also marked to be expedited as the patient has not been able to have follow up appointments due to this issue and is now in need of treatment.  Reference number for the authorization is [id number].  Dimple stated that the insurance company would be in contact with our office or the surgeon's in order to get clinical records to review for the auth.  Informed the mother of this information.  She provided a phone number for a Caitlin at Dr. Beal's [phone].  In case our office needs to be in contact with them to get clinical records.

## 2024-06-07 NOTE — TELEPHONE ENCOUNTER
Spoke with Teresa with the insurance company, she requested clinical notes to support the patient to continue to see the surgeon.  I called Caitlin at Dr. Beal's office and left a detailed message requesting clinical notes be sent to the insurance company.  The fax number provided by Teresa is  [phone] and was instructed to put ATTN Pending Auth # [id number].

## 2024-08-21 ENCOUNTER — TELEPHONE (OUTPATIENT)
Dept: CARDIOLOGY | Age: 24
End: 2024-08-21

## 2024-08-21 NOTE — TELEPHONE ENCOUNTER
CALLED PATIENT AND LEFT MESSAGE TO SCHEDULE ECHO.    Electronically signed by Kristy Ahmadi on 8/21/2024 at 9:56 AM

## 2024-09-19 ENCOUNTER — TELEPHONE (OUTPATIENT)
Age: 24
End: 2024-09-19

## 2024-10-17 ENCOUNTER — TELEPHONE (OUTPATIENT)
Dept: CARDIOLOGY | Age: 24
End: 2024-10-17

## 2024-10-17 NOTE — TELEPHONE ENCOUNTER
CALLED PATIENT 2X AND LEFT MESSAGE TO SCHEDULE ECHO.    Electronically signed by Kristy Ahmadi on 10/17/2024 at 9:38 AM

## 2024-10-21 ENCOUNTER — TELEPHONE (OUTPATIENT)
Dept: CARDIOLOGY | Age: 24
End: 2024-10-21

## 2024-10-21 NOTE — TELEPHONE ENCOUNTER
Called patient twice to attempt to schedule their echo. No return call to schedule. Patient will be removed from workqueue list. Physician will need to place new order if test is still needed and patient decides to proceed. Thank you.       Electronically signed by Kristy Ahmadi on 10/21/2024 at 2:08 PM

## 2025-03-03 ENCOUNTER — OFFICE VISIT (OUTPATIENT)
Dept: FAMILY MEDICINE CLINIC | Age: 25
End: 2025-03-03
Payer: MEDICAID

## 2025-03-03 VITALS
WEIGHT: 160.6 LBS | RESPIRATION RATE: 16 BRPM | OXYGEN SATURATION: 98 % | BODY MASS INDEX: 21.75 KG/M2 | HEART RATE: 86 BPM | TEMPERATURE: 97.7 F | DIASTOLIC BLOOD PRESSURE: 62 MMHG | SYSTOLIC BLOOD PRESSURE: 108 MMHG | HEIGHT: 72 IN

## 2025-03-03 DIAGNOSIS — J90 PLEURAL EFFUSION: ICD-10-CM

## 2025-03-03 DIAGNOSIS — I31.39 PERICARDIAL EFFUSION: ICD-10-CM

## 2025-03-03 DIAGNOSIS — J30.0 VASOMOTOR RHINITIS: Primary | ICD-10-CM

## 2025-03-03 PROCEDURE — 99213 OFFICE O/P EST LOW 20 MIN: CPT | Performed by: NURSE PRACTITIONER

## 2025-03-03 PROCEDURE — G8427 DOCREV CUR MEDS BY ELIG CLIN: HCPCS | Performed by: NURSE PRACTITIONER

## 2025-03-03 PROCEDURE — 1036F TOBACCO NON-USER: CPT | Performed by: NURSE PRACTITIONER

## 2025-03-03 PROCEDURE — G8420 CALC BMI NORM PARAMETERS: HCPCS | Performed by: NURSE PRACTITIONER

## 2025-03-03 SDOH — ECONOMIC STABILITY: FOOD INSECURITY: WITHIN THE PAST 12 MONTHS, THE FOOD YOU BOUGHT JUST DIDN'T LAST AND YOU DIDN'T HAVE MONEY TO GET MORE.: NEVER TRUE

## 2025-03-03 SDOH — ECONOMIC STABILITY: FOOD INSECURITY: WITHIN THE PAST 12 MONTHS, YOU WORRIED THAT YOUR FOOD WOULD RUN OUT BEFORE YOU GOT MONEY TO BUY MORE.: NEVER TRUE

## 2025-03-03 ASSESSMENT — ENCOUNTER SYMPTOMS
TROUBLE SWALLOWING: 0
WHEEZING: 0
BLOOD IN STOOL: 0
EYE DISCHARGE: 0
SINUS PAIN: 0
COUGH: 0
CONSTIPATION: 0
COLOR CHANGE: 0
APNEA: 0
ANAL BLEEDING: 0
FACIAL SWELLING: 0
VOMITING: 0
PHOTOPHOBIA: 0
SHORTNESS OF BREATH: 0
RECTAL PAIN: 0
EYE PAIN: 0
CHEST TIGHTNESS: 0
SINUS PRESSURE: 0
ABDOMINAL PAIN: 0
EYE REDNESS: 0
EYE ITCHING: 0
RHINORRHEA: 1
BACK PAIN: 0
VOICE CHANGE: 0
ABDOMINAL DISTENTION: 0
SORE THROAT: 0
NAUSEA: 0
DIARRHEA: 0
CHOKING: 0
STRIDOR: 0

## 2025-03-03 ASSESSMENT — PATIENT HEALTH QUESTIONNAIRE - PHQ9
1. LITTLE INTEREST OR PLEASURE IN DOING THINGS: NOT AT ALL
SUM OF ALL RESPONSES TO PHQ QUESTIONS 1-9: 0
2. FEELING DOWN, DEPRESSED OR HOPELESS: NOT AT ALL

## 2025-03-03 NOTE — PATIENT INSTRUCTIONS
[email or web address]    [facility] $4 Prescription Program  What they offer: Prescription Program includes up to a 30-day supply for $4 and a 90-day supply for $10 of some covered generic drugs at commonly prescribed dosages  Website: https://www.[employer]/cp/4-prescriptions/1272239  [facility] Prescription Assistance Program  Phone: [phone]  Address: [address]  What they offer: financial assistance with rent if individual qualifies or services have been shut off  Contact: [phone]; [address] [place] [address]  [facility]: [address]  Phone number: [phone]  Website: https://www.qualifyor.AirCell   [facility]: [address] [address]  Phone number: [phone]  Website: [email or web address]: [address] [facility] [address]  Phone number: [phone]  [facility] Food Resources*  (Call [employer]/211 for more resources)     HELP NETWORK OF [facility]:  What they do: Provides 24-hr, 7 days a week access to information on community resources for food & clothing help for [facility] AND [facility]  Phone: 211 or 941-053-3058  Text “HELP NETWORK” to 094302 for local food resources  DEPARTMENT OF JOB AND FAMILY SERVICES:  What they do: SNAP, provide a card to use like cash to purchase healthy foods at approved retailers  [place] Benefits Phone Number: 1-331.863.6200   [facility] DJFS: [address] [place] [address]  Phone: [phone]   [facility] DJFS: [address] [place] [address]  Phone: [phone]  [facility] DJFS: [address] [place] [address]  Phone: [phone]  Website: jfs.[place].gov  [facility]:  [address] [place] [address]  What they offer: Food and clothing distribution on Tuesdays from 9 am to 12pm & Thursdays

## 2025-03-03 NOTE — PROGRESS NOTES
difficulty, weakness, light-headedness, numbness and headaches.   Hematological:  Negative for adenopathy. Does not bruise/bleed easily.   Psychiatric/Behavioral:  Negative for agitation, behavioral problems, confusion, decreased concentration, hallucinations, self-injury, sleep disturbance and suicidal ideas. The patient is not nervous/anxious and is not hyperactive.          Current Outpatient Medications:     albuterol sulfate HFA (VENTOLIN HFA) 108 (90 Base) MCG/ACT inhaler, Inhale 2 puffs into the lungs 4 times daily as needed for Wheezing, Disp: 54 g, Rfl: 1    Lisdexamfetamine Dimesylate (VYVANSE) 60 MG CAPS, Take 60 mg by mouth daily., Disp: , Rfl:   Allergies   Allergen Reactions    Vancomycin Itching and Nausea Only     Pt experienced sweating, nausea, generalized feeling of \"unwell\", flushed skin       Past Medical History:   Diagnosis Date    ADHD (attention deficit hyperactivity disorder) 2017     Past Surgical History:   Procedure Laterality Date    APPENDECTOMY      THORACOSCOPY Left 03/24/2023    THORACOSCOPY DECORTICATION VIDEO ASSISTED performed by Clarice Pulido DO at [facility]     Family History   Problem Relation Age of Onset    Cancer Paternal Grandfather         Pancreatic    Other Paternal Grandfather         COPD    High Cholesterol Paternal Grandfather     High Blood Pressure Paternal Grandfather     Diabetes Paternal Grandfather      Social History     Socioeconomic History    Marital status: Single     Spouse name: Not on file    Number of children: Not on file    Years of education: Not on file    Highest education level: Not on file   Occupational History    Not on file   Tobacco Use    Smoking status: Every Day    Smokeless tobacco: Never    Tobacco comments:     I used to smoke cigarettes and vape but i don’t anymore   Vaping Use    Vaping status: Former    Quit date: 3/24/2023    Substances: Nicotine, Flavoring    Devices: Disposable, Pre-filled or refillable cartridge

## 2025-07-23 ENCOUNTER — TELEPHONE (OUTPATIENT)
Dept: FAMILY MEDICINE CLINIC | Age: 25
End: 2025-07-23

## (undated) DEVICE — DRAIN SURG SGL COLL PT TB FOR ATS BG OASIS

## (undated) DEVICE — CLEANER,CAUTERY TIP,2X2",STERILE: Brand: MEDLINE

## (undated) DEVICE — TROCAR: Brand: KII® SLEEVE

## (undated) DEVICE — SOLUTION IRRIG 1000ML 0.9% SOD CHL USP POUR PLAS BTL

## (undated) DEVICE — SYRINGE MED 50ML LUERLOCK TIP

## (undated) DEVICE — WOUND RETRACTOR AND PROTECTOR: Brand: ALEXIS WOUND PROTECTOR-RETRACTOR

## (undated) DEVICE — DOUBLE BASIN SET: Brand: MEDLINE INDUSTRIES, INC.

## (undated) DEVICE — TRAP,MUCUS SPECIMEN,40CC: Brand: MEDLINE

## (undated) DEVICE — 3M™ IOBAN™ 2 ANTIMICROBIAL INCISE DRAPE 6650EZ: Brand: IOBAN™ 2

## (undated) DEVICE — E-Z CLEAN, NON-STICK, PTFE COATED, ELECTROSURGICAL BLADE ELECTRODE, MODIFIED EXTENDED INSULATION, 2.5 INCH (6.35 CM): Brand: MEGADYNE

## (undated) DEVICE — THORACIC: Brand: MEDLINE INDUSTRIES, INC.

## (undated) DEVICE — TROCAR: Brand: KII FIOS FIRST ENTRY

## (undated) DEVICE — WARMER SCP LAP

## (undated) DEVICE — TUBING, SUCTION, 3/16" X 12', STRAIGHT: Brand: MEDLINE

## (undated) DEVICE — AGENT HEMSTAT W4XL8IN OXIDIZED REGENERATED CELOS ABSRB

## (undated) DEVICE — BLADE ES L6IN ELASTOMERIC COAT EXT DURABLE BEND UPTO 90DEG

## (undated) DEVICE — 3M(TM) MEDIPORE(TM) +PAD SOFT CLOTH ADHESIVE WOUND DRESSING 3569: Brand: 3M™ MEDIPORE™

## (undated) DEVICE — GLOVE SURG SZ 65 THK91MIL LTX FREE SYN POLYISOPRENE

## (undated) DEVICE — GLOVE ORANGE PI 7   MSG9070